# Patient Record
Sex: MALE | Race: WHITE | NOT HISPANIC OR LATINO | URBAN - METROPOLITAN AREA
[De-identification: names, ages, dates, MRNs, and addresses within clinical notes are randomized per-mention and may not be internally consistent; named-entity substitution may affect disease eponyms.]

---

## 2021-10-27 ENCOUNTER — INPATIENT (INPATIENT)
Facility: HOSPITAL | Age: 41
LOS: 3 days | Discharge: HOME | DRG: 103 | End: 2021-10-31
Attending: PSYCHIATRY & NEUROLOGY | Admitting: PSYCHIATRY & NEUROLOGY
Payer: COMMERCIAL

## 2021-10-27 VITALS
HEART RATE: 91 BPM | HEIGHT: 72 IN | SYSTOLIC BLOOD PRESSURE: 127 MMHG | DIASTOLIC BLOOD PRESSURE: 95 MMHG | OXYGEN SATURATION: 94 % | TEMPERATURE: 98 F | RESPIRATION RATE: 15 BRPM | WEIGHT: 184.97 LBS

## 2021-10-27 LAB
ALBUMIN SERPL ELPH-MCNC: 3.9 G/DL — SIGNIFICANT CHANGE UP (ref 3.4–5)
ALP SERPL-CCNC: 84 U/L — SIGNIFICANT CHANGE UP (ref 40–120)
ALT FLD-CCNC: 20 U/L — SIGNIFICANT CHANGE UP (ref 12–42)
ANION GAP SERPL CALC-SCNC: 11 MMOL/L — SIGNIFICANT CHANGE UP (ref 9–16)
APTT BLD: 37.8 SEC — HIGH (ref 27.5–35.5)
AST SERPL-CCNC: 15 U/L — SIGNIFICANT CHANGE UP (ref 15–37)
BASOPHILS # BLD AUTO: 0.08 K/UL — SIGNIFICANT CHANGE UP (ref 0–0.2)
BASOPHILS NFR BLD AUTO: 1 % — SIGNIFICANT CHANGE UP (ref 0–2)
BILIRUB SERPL-MCNC: 0.7 MG/DL — SIGNIFICANT CHANGE UP (ref 0.2–1.2)
BUN SERPL-MCNC: 12 MG/DL — SIGNIFICANT CHANGE UP (ref 7–23)
CALCIUM SERPL-MCNC: 9.2 MG/DL — SIGNIFICANT CHANGE UP (ref 8.5–10.5)
CHLORIDE SERPL-SCNC: 103 MMOL/L — SIGNIFICANT CHANGE UP (ref 96–108)
CO2 SERPL-SCNC: 26 MMOL/L — SIGNIFICANT CHANGE UP (ref 22–31)
CREAT SERPL-MCNC: 0.81 MG/DL — SIGNIFICANT CHANGE UP (ref 0.5–1.3)
EOSINOPHIL # BLD AUTO: 0.17 K/UL — SIGNIFICANT CHANGE UP (ref 0–0.5)
EOSINOPHIL NFR BLD AUTO: 2.1 % — SIGNIFICANT CHANGE UP (ref 0–6)
GLUCOSE BLDC GLUCOMTR-MCNC: 89 MG/DL — SIGNIFICANT CHANGE UP (ref 70–99)
GLUCOSE SERPL-MCNC: 99 MG/DL — SIGNIFICANT CHANGE UP (ref 70–99)
HCT VFR BLD CALC: 46.7 % — SIGNIFICANT CHANGE UP (ref 39–50)
HGB BLD-MCNC: 15.9 G/DL — SIGNIFICANT CHANGE UP (ref 13–17)
IMM GRANULOCYTES NFR BLD AUTO: 0.5 % — SIGNIFICANT CHANGE UP (ref 0–1.5)
INR BLD: 1.02 — SIGNIFICANT CHANGE UP (ref 0.88–1.16)
LYMPHOCYTES # BLD AUTO: 2.61 K/UL — SIGNIFICANT CHANGE UP (ref 1–3.3)
LYMPHOCYTES # BLD AUTO: 31.5 % — SIGNIFICANT CHANGE UP (ref 13–44)
MCHC RBC-ENTMCNC: 31.1 PG — SIGNIFICANT CHANGE UP (ref 27–34)
MCHC RBC-ENTMCNC: 34 GM/DL — SIGNIFICANT CHANGE UP (ref 32–36)
MCV RBC AUTO: 91.2 FL — SIGNIFICANT CHANGE UP (ref 80–100)
MONOCYTES # BLD AUTO: 0.82 K/UL — SIGNIFICANT CHANGE UP (ref 0–0.9)
MONOCYTES NFR BLD AUTO: 9.9 % — SIGNIFICANT CHANGE UP (ref 2–14)
NEUTROPHILS # BLD AUTO: 4.57 K/UL — SIGNIFICANT CHANGE UP (ref 1.8–7.4)
NEUTROPHILS NFR BLD AUTO: 55 % — SIGNIFICANT CHANGE UP (ref 43–77)
NRBC # BLD: 0 /100 WBCS — SIGNIFICANT CHANGE UP (ref 0–0)
PLATELET # BLD AUTO: 274 K/UL — SIGNIFICANT CHANGE UP (ref 150–400)
POTASSIUM SERPL-MCNC: 4.2 MMOL/L — SIGNIFICANT CHANGE UP (ref 3.5–5.3)
POTASSIUM SERPL-SCNC: 4.2 MMOL/L — SIGNIFICANT CHANGE UP (ref 3.5–5.3)
PROT SERPL-MCNC: 7.6 G/DL — SIGNIFICANT CHANGE UP (ref 6.4–8.2)
PROTHROM AB SERPL-ACNC: 12 SEC — SIGNIFICANT CHANGE UP (ref 10.6–13.6)
RBC # BLD: 5.12 M/UL — SIGNIFICANT CHANGE UP (ref 4.2–5.8)
RBC # FLD: 12.5 % — SIGNIFICANT CHANGE UP (ref 10.3–14.5)
SARS-COV-2 RNA SPEC QL NAA+PROBE: SIGNIFICANT CHANGE UP
SODIUM SERPL-SCNC: 140 MMOL/L — SIGNIFICANT CHANGE UP (ref 132–145)
TROPONIN I, HIGH SENSITIVITY RESULT: 4.6 NG/L — SIGNIFICANT CHANGE UP
WBC # BLD: 8.29 K/UL — SIGNIFICANT CHANGE UP (ref 3.8–10.5)
WBC # FLD AUTO: 8.29 K/UL — SIGNIFICANT CHANGE UP (ref 3.8–10.5)

## 2021-10-27 PROCEDURE — 70450 CT HEAD/BRAIN W/O DYE: CPT | Mod: 26,59

## 2021-10-27 PROCEDURE — 71275 CT ANGIOGRAPHY CHEST: CPT | Mod: 26

## 2021-10-27 PROCEDURE — 93010 ELECTROCARDIOGRAM REPORT: CPT

## 2021-10-27 PROCEDURE — 70498 CT ANGIOGRAPHY NECK: CPT | Mod: 26

## 2021-10-27 PROCEDURE — 0042T: CPT | Mod: 26

## 2021-10-27 PROCEDURE — 70496 CT ANGIOGRAPHY HEAD: CPT | Mod: 26

## 2021-10-27 PROCEDURE — 74174 CTA ABD&PLVS W/CONTRAST: CPT | Mod: 26

## 2021-10-27 PROCEDURE — 99292 CRITICAL CARE ADDL 30 MIN: CPT

## 2021-10-27 PROCEDURE — 71046 X-RAY EXAM CHEST 2 VIEWS: CPT | Mod: 26

## 2021-10-27 PROCEDURE — 99291 CRITICAL CARE FIRST HOUR: CPT | Mod: 25

## 2021-10-27 RX ORDER — ATORVASTATIN CALCIUM 80 MG/1
80 TABLET, FILM COATED ORAL AT BEDTIME
Refills: 0 | Status: DISCONTINUED | OUTPATIENT
Start: 2021-10-27 | End: 2021-10-31

## 2021-10-27 RX ORDER — ASPIRIN/CALCIUM CARB/MAGNESIUM 324 MG
325 TABLET ORAL ONCE
Refills: 0 | Status: COMPLETED | OUTPATIENT
Start: 2021-10-27 | End: 2021-10-27

## 2021-10-27 RX ORDER — ASPIRIN/CALCIUM CARB/MAGNESIUM 324 MG
81 TABLET ORAL DAILY
Refills: 0 | Status: DISCONTINUED | OUTPATIENT
Start: 2021-10-28 | End: 2021-10-31

## 2021-10-27 RX ORDER — CLOPIDOGREL BISULFATE 75 MG/1
75 TABLET, FILM COATED ORAL DAILY
Refills: 0 | Status: DISCONTINUED | OUTPATIENT
Start: 2021-10-28 | End: 2021-10-28

## 2021-10-27 RX ORDER — ENOXAPARIN SODIUM 100 MG/ML
40 INJECTION SUBCUTANEOUS AT BEDTIME
Refills: 0 | Status: DISCONTINUED | OUTPATIENT
Start: 2021-10-27 | End: 2021-10-30

## 2021-10-27 RX ADMIN — Medication 325 MILLIGRAM(S): at 15:06

## 2021-10-27 RX ADMIN — ENOXAPARIN SODIUM 40 MILLIGRAM(S): 100 INJECTION SUBCUTANEOUS at 23:17

## 2021-10-27 RX ADMIN — ATORVASTATIN CALCIUM 80 MILLIGRAM(S): 80 TABLET, FILM COATED ORAL at 23:17

## 2021-10-27 NOTE — ED PROVIDER NOTE - NEUROLOGICAL, MLM
Left sided facial droop. Left sided facial numbness. Decreased gross sensation to L arm and L leg. Preserved ability to raise brows. Normal finger to nose.

## 2021-10-27 NOTE — ED ADULT NURSE NOTE - OBJECTIVE STATEMENT
Pt with c/o intermittent chest pain and sob x3 days. This morning pt woke up experiencing numbness to Lt side of body. Stroke code was called at 11:45 by Dr Adrian.

## 2021-10-27 NOTE — ED PROVIDER NOTE - OBJECTIVE STATEMENT
42 y/o M with no PMHx [active smoker] presents to the ED for evaluation. Pt reports he was having some CP about 3 days ago that started "while doing nothing." He describes the pain as mostly centrally located. Pt noted the CP worsened 2 days ago and was described as waxing and waning [sharp at times] that is worse with swallowing food.  He also noted some pain to the L side of his body [face, arm, and leg]. Pt went to sleep at midnight last night. This morning, he woke up at 6:30am feeling as though he did not sleep well. Pt states he felt as though he was not moving "fast enough." Pt noted having new L sided numbness [face, arm, leg] and felt it was difficult to catch his breath. He also states his L eye feels foggy and tingly. Pt endorses the face numbness is constant while the L arm and leg numbness is intermittent. His wife had to help walk him to the ED as he was having significant dizziness. Pt states he has not seen a PCP since the COVID-19 pandemic started. He denies fevers, chills, rash, neck pain, or urinary symptoms.

## 2021-10-27 NOTE — ED ADULT NURSE REASSESSMENT NOTE - NS ED NURSE REASSESS COMMENT FT1
1410 Dysphasia test complete - see flowsheet   1622 Pt in no acute distress denies any discomfort at this time , jose miguel toribio  1751 Pt admitted to [facility] , pending transportation jose miguel toribio
Pt. received from RN Tyler Brennan3, semi fowlers in stretcher breathing at ease on room air in NAD. 20g to RAC no redness, swelling, or tenderness noted. Pt is admitted to [facility] 5[facility]. Transport at bedside. Wife at bedside.

## 2021-10-27 NOTE — ED PROVIDER NOTE - CRITICAL CARE ATTENDING CONTRIBUTION TO CARE
The patient was seen immediately upon arrival due to a high probability of imminent or life-threatening deterioration secondary to stroke activation, which required my direct attention, intervention, and personal management at the bedside. I have personally provided critical care time exclusive of time spent on separately billable procedures. Time includes review of laboratory data, radiology results, discussion with consultants, discussion with the patient's family, and monitoring for potential decompensation.

## 2021-10-27 NOTE — H&P ADULT - NSHPLABSRESULTS_GEN_ALL_CORE
Vital Signs Last 24 Hrs  T(C): 36.8 (27 Oct 2021 21:08), Max: 36.8 (27 Oct 2021 08:58)  T(F): 98.3 (27 Oct 2021 21:08), Max: 98.3 (27 Oct 2021 20:17)  HR: 71 (27 Oct 2021 21:28) (71 - 91)  BP: 134/96 (27 Oct 2021 21:28) (124/83 - 153/91)  BP(mean): 110 (27 Oct 2021 21:28) (98 - 110)  RR: 18 (27 Oct 2021 21:28) (15 - 18)  SpO2: 97% (27 Oct 2021 21:28) (94% - 98%)      Fingerstick Blood Glucose: CAPILLARY BLOOD GLUCOSE      POCT Blood Glucose.: 89 mg/dL (27 Oct 2021 11:47)    LABS:                        15.9   8.29  )-----------( 274      ( 27 Oct 2021 12:19 )             46.7     10-27    140  |  103  |  12  ----------------------------<  99  4.2   |  26  |  0.81    Ca    9.2      27 Oct 2021 12:19    TPro  7.6  /  Alb  3.9  /  TBili  0.7  /  DBili  x   /  AST  15  /  ALT  20  /  AlkPhos  84  10-27    PT/INR - ( 27 Oct 2021 12:19 )   PT: 12.0 sec;   INR: 1.02          PTT - ( 27 Oct 2021 12:19 )  PTT:37.8 sec          RADIOLOGY & ADDITIONAL STUDIES:    HCT:     CTA: Vital Signs Last 24 Hrs  T(C): 36.8 (27 Oct 2021 21:08), Max: 36.8 (27 Oct 2021 08:58)  T(F): 98.3 (27 Oct 2021 21:08), Max: 98.3 (27 Oct 2021 20:17)  HR: 71 (27 Oct 2021 21:28) (71 - 91)  BP: 134/96 (27 Oct 2021 21:28) (124/83 - 153/91)  BP(mean): 110 (27 Oct 2021 21:28) (98 - 110)  RR: 18 (27 Oct 2021 21:28) (15 - 18)  SpO2: 97% (27 Oct 2021 21:28) (94% - 98%)      Fingerstick Blood Glucose: CAPILLARY BLOOD GLUCOSE      POCT Blood Glucose.: 89 mg/dL (27 Oct 2021 11:47)    LABS:                        15.9   8.29  )-----------( 274      ( 27 Oct 2021 12:19 )             46.7     10-27    140  |  103  |  12  ----------------------------<  99  4.2   |  26  |  0.81    Ca    9.2      27 Oct 2021 12:19    TPro  7.6  /  Alb  3.9  /  TBili  0.7  /  DBili  x   /  AST  15  /  ALT  20  /  AlkPhos  84  10-27    PT/INR - ( 27 Oct 2021 12:19 )   PT: 12.0 sec;   INR: 1.02          PTT - ( 27 Oct 2021 12:19 )  PTT:37.8 sec      RADIOLOGY & ADDITIONAL STUDIES:    HCT:     CTA: Vital Signs Last 24 Hrs  T(C): 36.8 (27 Oct 2021 21:08), Max: 36.8 (27 Oct 2021 08:58)  T(F): 98.3 (27 Oct 2021 21:08), Max: 98.3 (27 Oct 2021 20:17)  HR: 71 (27 Oct 2021 21:28) (71 - 91)  BP: 134/96 (27 Oct 2021 21:28) (124/83 - 153/91)  BP(mean): 110 (27 Oct 2021 21:28) (98 - 110)  RR: 18 (27 Oct 2021 21:28) (15 - 18)  SpO2: 97% (27 Oct 2021 21:28) (94% - 98%)      Fingerstick Blood Glucose: CAPILLARY BLOOD GLUCOSE      POCT Blood Glucose.: 89 mg/dL (27 Oct 2021 11:47)    LABS:                        15.9   8.29  )-----------( 274      ( 27 Oct 2021 12:19 )             46.7     10-27    140  |  103  |  12  ----------------------------<  99  4.2   |  26  |  0.81    Ca    9.2      27 Oct 2021 12:19    TPro  7.6  /  Alb  3.9  /  TBili  0.7  /  DBili  x   /  AST  15  /  ALT  20  /  AlkPhos  84  10-27    PT/INR - ( 27 Oct 2021 12:19 )   PT: 12.0 sec;   INR: 1.02          PTT - ( 27 Oct 2021 12:19 )  PTT:37.8 sec      RADIOLOGY & ADDITIONAL STUDIES:    HCT: No acute intracranial hemorrhage, mass effect or large demarcated territorial infarction..    CTA: < from: CT Angio Head w/ IV Cont (10.27.21 @ 12:26) >  IMPRESSION: No large vessel occlusion.    < from: CT Angio Neck w/ IV Cont (10.27.21 @ 12:26) >  IMPRESSION: Normal CTA of the neck.    < from: CT Perfusion w/ Maps w/ IV Cont (10.27.21 @ 12:23) >  IMPRESSION: Normal CT perfusion study.    < from: CT Angio Chest PE Protocol w/ IV Cont (10.27.21 @ 12:32) >    IMPRESSION:  1.  Spiculated cavitary pulmonary nodule in right upper lobe 0.9 cm, suspicious for primary lung malignancy.  2.  Few upper limit of normal size mediastinal nodes, possibly malignant adenopathy. Advise FDG PET/CT correlation and/or tissue sampling.  3.  Mild centrilobular and paraseptal emphysema.  4.  No aortic dissection.

## 2021-10-27 NOTE — ED PROVIDER NOTE - CLINICAL SUMMARY MEDICAL DECISION MAKING FREE TEXT BOX
40 y/o M presenting with L sided facial numbness as well as L arm and L leg numbness. Pt also noted to have facial droop. Pt woke up at 6:30 am today with symptoms. Stroke code activated. Discussed case with stroke neurologist Dr. Isaac who does not think there is a large vessel stroke. Dr Isaac states Pt is not a TPA candidate but agrees with concern for possible dissection. CT neck and CTA ordered to r/o dissection. If negative, will plan for likely transfer to [facility] for admission to neurology. 40 y/o M presenting with L sided facial numbness as well as L arm and L leg numbness. Pt also noted to have facial droop. Pt woke up at 6:30 am today with symptoms. Stroke code activated. Discussed case with stroke neurologist Dr. Isaac who does not think there is a large vessel stroke. Dr Isaac states Pt is not a TPA candidate but agrees with concern for possible dissection. CT neck and CTA ordered to r/o dissection. If negative, will plan for likely transfer to [facility] for admission to neurology.    430pm: patient found to have R sided 9mm spiculated mass on CT, concerning per radiology for cancer. Discussed findings with patient and Dr. Isaac. To be admitted to [facility] for further evaluation.

## 2021-10-27 NOTE — ED PROVIDER NOTE - PROGRESS NOTE DETAILS
Went to go see Pt but he was at XR at the time. Discussed case with Dr. Isaac Preliminary CT read is negative as per radiology resident. Stroke code activated Pt taken to CT scan Discussed case with Dr. Isaac of neurology who states stroke cannot be ruled out but not Pt is not a TPA candidate as he is out of the window. Discussed case with Dr. Isaac of neurology who states Pt is not a TPA candidate as he is out of the window. Spoke with Dr. Isaac who would like Pt admitted and given a dose of Tylenol.

## 2021-10-27 NOTE — H&P ADULT - NSHPPHYSICALEXAM_GEN_ALL_CORE
Physical exam:  General: No acute distress, awake and alert  Cardiovascular: Regular rate and rhythm, no murmurs, rubs, or gallops. No bruits  Pulmonary: Anterior breath sounds clear bilaterally, no crackles or wheezing. No use of accessory muscles  GI: Abdomen soft, non-tender, non-distended    Neurologic:  -Mental status: Awake, alert, oriented to person, place, and time. Speech is fluent with intact naming, repetition, and comprehension, no dysarthria. Recent and remote memory intact. Follows commands. Attention/concentration intact. Fund of knowledge appropriate.  -Cranial nerves:   II: Visual fields are full to confrontation.  III, IV, VI: Extraocular movements are intact without nystagmus. Pupils equally round and reactive to light  V:  Facial sensation V1-V3 equal and intact   VII: Face is symmetric with normal eye closure and smile  VIII: Hearing is bilaterally intact to finger rub  IX, X: Uvula is midline and soft palate rises symmetrically  XI: Head turning and shoulder shrug are intact.  XII: Tongue protrudes midline  Motor: Normal bulk and tone. No pronator drift. Strength bilateral upper extremity 5/5, bilateral lower extremities 5/5.  Rapid alternating movements intact and symmetric  Sensation: Intact to light touch bilaterally. No neglect or extinction on double simultaneous testing.  Coordination: No dysmetria on finger-to-nose and heel-to-shin bilaterally  Reflexes: Downgoing toes bilaterally   Gait: Narrow gait and steady    NIHSS: **** ASPECT Score: *** ICH Score: *** (GCS) Physical exam:  General: No acute distress, awake and alert  Cardiovascular: Regular rate and rhythm, no murmurs, rubs, or gallops. No bruits  Pulmonary: Anterior breath sounds clear bilaterally, no crackles or wheezing. No use of accessory muscles  GI: Abdomen soft, non-tender, non-distended    Neurologic:  -Mental status: Awake, alert, oriented to person, place, and time. Speech is fluent with intact naming, repetition, and comprehension, no dysarthria. Recent and remote memory intact. Follows commands. Attention/concentration intact. Fund of knowledge appropriate.  -Cranial nerves:   II: Visual fields are full to confrontation, left eye blurry vision > right eye  III, IV, VI: Extraocular movements are intact without nystagmus. Pupils equally round and reactive to light  V:  left V1-V3 ~70% sensation compared to right V1-V3  VII: mild left facial droop.  VIII: Hearing is grossly intact bilaterally  IX, X: Uvula is midline and soft palate rises symmetrically  XI: Head turning and shoulder shrug are intact.  XII: Tongue protrudes midline  Motor: Normal bulk and tone. No pronator drift. Strength bilateral upper extremity 5/5, bilateral lower extremities 5/5.  Sensation: Left arm and leg ~80% sensation to light touch compared to right side. No neglect or extinction on double simultaneous testing.  Coordination: No dysmetria on finger-to-nose and heel-to-shin bilaterally  Reflexes: Downgoing toes bilaterally   Gait: Narrow gait and steady    NIHSS: 2 ASPECT Score: 10 Physical exam:  General: No acute distress, awake and alert  Cardiovascular: Regular rate and rhythm, no murmurs, rubs, or gallops.   Pulmonary: Anterior breath sounds clear bilaterally, no crackles or wheezing. No use of accessory muscles  GI: Abdomen soft, non-tender, non-distended    Neurologic:  -Mental status: Awake, alert, oriented to person, place, and time. Speech is fluent with intact naming, repetition, and comprehension, no dysarthria. Recent and remote memory intact. Follows commands. Attention/concentration intact. Fund of knowledge appropriate.  -Cranial nerves:   II: Visual fields are full to confrontation, left eye blurry vision > right eye  III, IV, VI: Extraocular movements are intact without nystagmus. Pupils equally round and reactive to light  V:  left V1-V3 ~70% sensation compared to right V1-V3  VII: mild left facial droop.  VIII: Hearing is grossly intact bilaterally  IX, X: Uvula is midline and soft palate rises symmetrically  XI: Head turning and shoulder shrug are intact.  XII: Tongue protrudes midline  Motor: Normal bulk and tone. No pronator drift. Strength bilateral upper extremity 5/5, bilateral lower extremities 5/5.  Sensation: Left arm and leg ~80% sensation to light touch compared to right side. No neglect or extinction on double simultaneous testing.  Coordination: No dysmetria on finger-to-nose and heel-to-shin bilaterally  Reflexes: Downgoing toes bilaterally   Gait: Narrow gait and steady    NIHSS: 2 ASPECT Score: 10

## 2021-10-27 NOTE — H&P ADULT - NSHPSOCIALHISTORY_GEN_ALL_CORE
SOCIAL HISTORY:   Patient lives with *** at ***.   Smoking status:  Drinking:  Drug Use: SOCIAL HISTORY:   Patient lives with wife in apartment  Smoking status: 39 pack years  Drinking: 3-5 drinks a day during weekend  Drug Use: denies

## 2021-10-27 NOTE — PATIENT PROFILE ADULT - COMPLETE THE FOLLOWING IF THE PATIENT REFUSES THE INFLUENZA VACCINE:
Patient seen and examined at bedside with me.  No complaints.  Pain well controlled.  Tolerating regular diet.  Denies nausea or vomiting.  Ambulating.  Voiding without difficulty.  Passing flatus.  Bottle feeding.  Denies HA, dizziness, CP, SOB, LE pain.  VSS.  incision c/d/i.  Plan to DC home today.  DC instructions reviewed.  Call parameters reviewed.
Risks/benefits discussed with patient/surrogate

## 2021-10-27 NOTE — H&P ADULT - ASSESSMENT
41y Male with 39 pack year hx, no PMHx, last seen PCP 3 years ago, presents to [facility] for chest pain/SOB x 3 days and acute onset of left sided numbness starting at 0630 10/27, transferred to [facility] for stroke workup and r/o. NIHSS on arrival is 2 (left facial, left sensation deficit), ASPECT 10. Found to have lung mass suspicious for malignancy on Chest CT PE protocol. Admitted to stroke tele.     Neuro  #CVA workup  - continue aspirin 81mg daily  - continue atorvastatin 80mg daily  - q4hr stroke neuro checks and vitals  - obtain MRI Brain without contrast short stroke protocol  - Stroke Code HCT Results: neg  - Stroke Code CTA Results: neg  - Stroke education    Cards  #HTN  - permissive hypertension, Goal -180  - obtain TTE with bubble  - Stroke Code EKG Results: NSR    #HLD  - high dose statin as above in CVA  - LDL results: pending    Pulm  - call provider if SPO2 < 94%  - spiculated cavitary pulmonary nodule in right upper lobe 0.9cm, possible malignant adenopathy, pending further recs    GI  #Nutrition/Fluids/Electrolytes   - replete K<4 and Mg <2  - Diet: regular    Renal  - monitor I&Os    Infectious Disease  - monitor and trend WBCs    Endocrine  #DM  - A1C results: pending    - TSH results: pending    DVT Prophylaxis  - lovenox sq for DVT prophylaxis   - SCDs for DVT prophylaxis     Dispo: pending PT/OT eval     Discussed with Neurology Attending Dr. Isaac

## 2021-10-27 NOTE — H&P ADULT - NSHPREVIEWOFSYSTEMS_GEN_ALL_CORE
ROS: ***  Constitutional: No fever, weight loss or fatigue  Eyes: No eye pain, visual disturbances, or discharge  ENMT:  No difficulty hearing, tinnitus, vertigo; No sinus or throat pain  Neck: No pain or stiffness  Respiratory: No cough, wheezing, chills or hemoptysis  Cardiovascular: No chest pain, palpitations, shortness of breath, dizziness or leg swelling  Gastrointestinal: No abdominal pain. No nausea, vomiting or hematemesis; No diarrhea or constipation. Nohematochezia.  Genitourinary: No dysuria, frequency, hematuria or incontinence  Neurological: As per HPI  Skin: No itching, burning, rashes or lesions   Endocrine: No heat or cold intolerance; No hair loss  Musculoskeletal: No joint pain or swelling; No muscle, back or extremity pain  Psychiatric: No depression, anxiety, mood swings or difficulty sleeping  Heme/Lymph: No easy bruising or bleeding gums ROS:   Constitutional: No fever, weight loss or fatigue  Eyes: No eye pain or discharge  ENMT:  No difficulty hearing, tinnitus, vertigo; No sinus or throat pain  Neck: No pain or stiffness  Respiratory: No cough, wheezing, chills or hemoptysis  Cardiovascular: No palpitations, leg swelling  Gastrointestinal: No abdominal pain. No nausea, vomiting or hematemesis; No diarrhea or constipation.  Genitourinary: No dysuria, frequency, hematuria or incontinence  Neurological: As per HPI

## 2021-10-27 NOTE — H&P ADULT - HISTORY OF PRESENT ILLNESS
**STROKE HPI***    HPI: 41y Male with 39 pack year hx, no PMHx, last seen PCP 3 years ago, presents to [facility] for chest pain/SOB x 3 days and acute onset of left sided numbness starting at 0630 this AM. Pt states he was having chest pain on Sunday along with some SOB "while doing nothing" and stated his pain was centrally located. Pt then     · HPI Objective Statement: 42 y/o M with no PMHx [active smoker] presents to the ED for evaluation. Pt reports he was having some CP about 3 days ago that started "while doing nothing." He describes the pain as mostly centrally located. Pt noted the CP worsened 2 days ago and was described as waxing and waning [sharp at times] that is worse with swallowing food.  He also noted some pain to the L side of his body [face, arm, and leg]. Pt went to sleep at midnight last night. This morning, he woke up at 6:30am feeling as though he did not sleep well. Pt states he felt as though he was not moving "fast enough." Pt noted having new L sided numbness [face, arm, leg] and felt it was difficult to catch his breath. He also states his L eye feels foggy and tingly. Pt endorses the face numbness is constant while the L arm and leg numbness is intermittent. His wife had to help walk him to the ED as he was having significant dizziness. Pt states he has not seen a PCP since the COVID-19 pandemic started. He denies fevers, chills, rash, neck pain, or urinary symptoms.    **STROKE HPI***  incompelete  HPI: 41y Male with 39 pack year hx, no PMHx, last seen PCP 3 years ago, presents to [facility] for chest pain/SOB x 3 days and acute onset of left sided numbness starting at 0630 this AM. Pt states he was having chest pain on Sunday along with some SOB "while doing nothing" and stated his pain was centrally located. Pt noted chest pain worsened on monday and was provoked with swallowing food, also had pain of his left face, arm, and leg, stated he went to bed at midnight due to the pain. Pt then woke up on Wednesday morning at 0630 and shortly after started to have new left face, arm, and leg numbness and episode of SOB. Pt states numbness is intermittent, usually lasts around 15 seconds, and resolves itself however there is a short interval between each episode, states it feels like a "warm, tingly sensation" states happened at least 100 times today. Pt also endorses blurry vision from his left eye. Notes that he felt lightheaded and dizzy, had difficulty walking, denied room spinning sensation. States he felt "generally weak" and  felt "off." Pt denied any slurred speech, facial asymmetry, headache, focal weakness of extremities, abd pain, n/v/d.     Stroke code called at [facility], NIHSS 1, CT imaging negative for acute pathology. Noted to have left facial droop. After speaking with Dr. Isaac, pt was planned to be admitted to [facility] under stroke service to r/o stroke and further workup.    **STROKE HPI***  HPI: 41y Male with 39 pack year hx, no PMHx, last seen PCP 3 years ago, presents to [facility] for chest pain/SOB x 3 days and acute onset of left sided numbness starting at 0630 this AM. Pt states he was having chest pain on Sunday along with some SOB "while doing nothing" and stated his pain was centrally located. Pt noted chest pain worsened on monday and was provoked with swallowing food, also had pain of his left face, arm, and leg, stated he went to bed at midnight due to the pain. Pt then woke up on Wednesday morning at 0630 and shortly after started to have new left face, arm, and leg numbness and episode of SOB. Pt states numbness is intermittent, usually lasts around 15 seconds, and resolves itself however there is a short interval between each episode, states it feels like a "warm, tingly sensation" states happened at least 100 times today. Pt also endorses blurry vision from his left eye. Notes that he felt lightheaded and dizzy, had difficulty walking, denied room spinning sensation. States he felt "generally weak" and  felt "off." Pt denied any slurred speech, facial asymmetry, headache, focal weakness of extremities, abd pain, n/v/d.     Stroke code called at [facility], NIHSS 1, CT imaging negative for acute pathology. Noted to have left facial droop. Troponin negatives at [facility] ED. After speaking with Dr. Isaac, pt was planned to be admitted to [facility] under stroke service to r/o stroke and further workup.    ED Vitals:   T: 98.2F, HR: 91bpm, BP: 127/95mmHg, RR: 15/min, SpO2: 94% RA    Of note, pt is fully vaccinated for COVID-19 with J&J vaccine in April 2021, is fully independent, FULL CODE.

## 2021-10-28 DIAGNOSIS — R20.0 ANESTHESIA OF SKIN: ICD-10-CM

## 2021-10-28 DIAGNOSIS — F17.200 NICOTINE DEPENDENCE, UNSPECIFIED, UNCOMPLICATED: ICD-10-CM

## 2021-10-28 DIAGNOSIS — R07.89 OTHER CHEST PAIN: ICD-10-CM

## 2021-10-28 DIAGNOSIS — R91.1 SOLITARY PULMONARY NODULE: ICD-10-CM

## 2021-10-28 LAB
A1C WITH ESTIMATED AVERAGE GLUCOSE RESULT: 5.4 % — SIGNIFICANT CHANGE UP (ref 4–5.6)
ANION GAP SERPL CALC-SCNC: 6 MMOL/L — SIGNIFICANT CHANGE UP (ref 5–17)
BUN SERPL-MCNC: 12 MG/DL — SIGNIFICANT CHANGE UP (ref 7–23)
CALCIUM SERPL-MCNC: 9.4 MG/DL — SIGNIFICANT CHANGE UP (ref 8.4–10.5)
CHLORIDE SERPL-SCNC: 104 MMOL/L — SIGNIFICANT CHANGE UP (ref 96–108)
CHOLEST SERPL-MCNC: 245 MG/DL — HIGH
CO2 SERPL-SCNC: 28 MMOL/L — SIGNIFICANT CHANGE UP (ref 22–31)
CREAT SERPL-MCNC: 0.89 MG/DL — SIGNIFICANT CHANGE UP (ref 0.5–1.3)
ESTIMATED AVERAGE GLUCOSE: 108 MG/DL — SIGNIFICANT CHANGE UP (ref 68–114)
GLUCOSE SERPL-MCNC: 103 MG/DL — HIGH (ref 70–99)
HCT VFR BLD CALC: 47.5 % — SIGNIFICANT CHANGE UP (ref 39–50)
HDLC SERPL-MCNC: 68 MG/DL — SIGNIFICANT CHANGE UP
HGB BLD-MCNC: 15.5 G/DL — SIGNIFICANT CHANGE UP (ref 13–17)
LIPID PNL WITH DIRECT LDL SERPL: 152 MG/DL — HIGH
MAGNESIUM SERPL-MCNC: 2.2 MG/DL — SIGNIFICANT CHANGE UP (ref 1.6–2.6)
MCHC RBC-ENTMCNC: 30.6 PG — SIGNIFICANT CHANGE UP (ref 27–34)
MCHC RBC-ENTMCNC: 32.6 GM/DL — SIGNIFICANT CHANGE UP (ref 32–36)
MCV RBC AUTO: 93.7 FL — SIGNIFICANT CHANGE UP (ref 80–100)
NON HDL CHOLESTEROL: 177 MG/DL — HIGH
NRBC # BLD: 0 /100 WBCS — SIGNIFICANT CHANGE UP (ref 0–0)
PHOSPHATE SERPL-MCNC: 3.4 MG/DL — SIGNIFICANT CHANGE UP (ref 2.5–4.5)
PLATELET # BLD AUTO: 265 K/UL — SIGNIFICANT CHANGE UP (ref 150–400)
POTASSIUM SERPL-MCNC: 4.5 MMOL/L — SIGNIFICANT CHANGE UP (ref 3.5–5.3)
POTASSIUM SERPL-SCNC: 4.5 MMOL/L — SIGNIFICANT CHANGE UP (ref 3.5–5.3)
RBC # BLD: 5.07 M/UL — SIGNIFICANT CHANGE UP (ref 4.2–5.8)
RBC # FLD: 12.7 % — SIGNIFICANT CHANGE UP (ref 10.3–14.5)
SODIUM SERPL-SCNC: 138 MMOL/L — SIGNIFICANT CHANGE UP (ref 135–145)
TRIGL SERPL-MCNC: 124 MG/DL — SIGNIFICANT CHANGE UP
TROPONIN T SERPL-MCNC: 0.01 NG/ML — SIGNIFICANT CHANGE UP (ref 0–0.01)
TSH SERPL-MCNC: 1.88 UIU/ML — SIGNIFICANT CHANGE UP (ref 0.27–4.2)
WBC # BLD: 7.41 K/UL — SIGNIFICANT CHANGE UP (ref 3.8–10.5)
WBC # FLD AUTO: 7.41 K/UL — SIGNIFICANT CHANGE UP (ref 3.8–10.5)

## 2021-10-28 PROCEDURE — 99223 1ST HOSP IP/OBS HIGH 75: CPT

## 2021-10-28 PROCEDURE — 93306 TTE W/DOPPLER COMPLETE: CPT | Mod: 26

## 2021-10-28 PROCEDURE — 99221 1ST HOSP IP/OBS SF/LOW 40: CPT

## 2021-10-28 RX ORDER — ACETAMINOPHEN 500 MG
650 TABLET ORAL EVERY 6 HOURS
Refills: 0 | Status: DISCONTINUED | OUTPATIENT
Start: 2021-10-28 | End: 2021-10-31

## 2021-10-28 RX ORDER — NICOTINE POLACRILEX 2 MG
2 GUM BUCCAL DAILY
Refills: 0 | Status: DISCONTINUED | OUTPATIENT
Start: 2021-10-28 | End: 2021-10-30

## 2021-10-28 RX ADMIN — ENOXAPARIN SODIUM 40 MILLIGRAM(S): 100 INJECTION SUBCUTANEOUS at 21:34

## 2021-10-28 RX ADMIN — Medication 650 MILLIGRAM(S): at 13:54

## 2021-10-28 RX ADMIN — Medication 650 MILLIGRAM(S): at 14:50

## 2021-10-28 RX ADMIN — ATORVASTATIN CALCIUM 80 MILLIGRAM(S): 80 TABLET, FILM COATED ORAL at 21:34

## 2021-10-28 RX ADMIN — Medication 2 MILLIGRAM(S): at 12:54

## 2021-10-28 RX ADMIN — Medication 81 MILLIGRAM(S): at 12:29

## 2021-10-28 NOTE — PROGRESS NOTE ADULT - ASSESSMENT
41y Male with 39 pack year hx, no PMHx, last seen PCP 3 years ago, presents to [facility] for chest pain/SOB x 3 days and acute onset of left sided numbness starting at 0630 10/27, transferred to [facility] for stroke workup and r/o. NIHSS on arrival is 2 (left facial, left sensation deficit), ASPECT 10. Found to have lung mass suspicious for malignancy on Chest CT PE protocol. Admitted to stroke tele. No acute overnight event. Pending MRI, TTE    Neuro  #CVA workup  - continue aspirin 81mg daily  - continue atorvastatin 80mg daily  - q4hr stroke neuro checks and vitals  - obtain MRI Brain w contrast to r/o mets along with stroke.  - Stroke Code HCT Results: neg  - Stroke Code CTA Results: neg  - Stroke education    Cards  - BP goal<140 mmHg.  - obtain TTE with bubble  - Stroke Code EKG Results: NSR    #HLD  - high dose statin as above in CVA  - LDL results: 152    Pulm  #Lung mass (incidental finding):   - call provider if SPO2 < 94%  - spiculated cavitary pulmonary nodule in right upper lobe 0.9cm, possible malignant adenopathy.  - Spoke to patient about the mass. Pending further consult to set up Outpatient follow up.    GI  #Nutrition/Fluids/Electrolytes   - replete K<4 and Mg <2  - Diet: regular    Renal  - monitor I&Os    Infectious Disease  - monitor and trend WBCs    Endocrine  #DM  - A1C results: 5.4    - TSH results: pending    DVT Prophylaxis  - lovenox sq for DVT prophylaxis     Dispo: pending PT/OT eval     Discussed with Neurology Attending Dr. Isaac    IDR: Discussed plan, GOC with CM,SW, PT, OT.

## 2021-10-28 NOTE — PROGRESS NOTE ADULT - SUBJECTIVE AND OBJECTIVE BOX
Neurology Stroke Progress Note    INTERVAL HPI/OVERNIGHT EVENTS:  Patient seen and examined. O/N no acute event. Didn't have anymore chest pain.    MEDICATIONS  (STANDING):  aspirin enteric coated 81 milliGRAM(s) Oral daily  atorvastatin 80 milliGRAM(s) Oral at bedtime  enoxaparin Injectable 40 milliGRAM(s) SubCutaneous at bedtime    MEDICATIONS  (PRN):      Allergies    No Known Allergies    Intolerances        Vital Signs Last 24 Hrs  T(C): 36.4 (28 Oct 2021 06:30), Max: 36.8 (27 Oct 2021 08:58)  T(F): 97.6 (28 Oct 2021 06:30), Max: 98.3 (27 Oct 2021 20:17)  HR: 67 (28 Oct 2021 06:49) (67 - 91)  BP: 124/85 (28 Oct 2021 06:49) (120/74 - 153/91)  BP(mean): 101 (28 Oct 2021 06:49) (91 - 110)  RR: 16 (28 Oct 2021 06:49) (15 - 18)  SpO2: 99% (28 Oct 2021 06:49) (94% - 99%)    Physical exam:  General: No acute distress, awake and alert  Eyes: Anicteric sclerae, moist conjunctivae, see below for CNs  Neck: trachea midline, FROM, supple, no thyromegaly or lymphadenopathy  Cardiovascular: Regular rate and rhythm, no murmurs, rubs, or gallops. No carotid bruits.   Pulmonary: Anterior breath sounds clear bilaterally, no crackles or wheezing. No use of accessory muscles  GI: Abdomen soft, non-distended, non-tender  Extremities: Radial and DP pulses +2, no edema    Neurologic:  -Mental status: Awake, alert, oriented to person, place, and time. Speech is fluent with intact naming, repetition, and comprehension, no dysarthria. Recent and remote memory intact. Follows commands. Attention/concentration intact. Fund of knowledge appropriate.  -Cranial nerves:   II: Visual fields are full to confrontation.  III, IV, VI: Extraocular movements are intact without nystagmus. Pupils equally round and reactive to light  V:  Facial sensation V1-V3 decreased on left side  VII: Face is symmetric with normal eye closure and smile  VIII: Hearing is bilaterally intact to finger rub  IX, X: Uvula is midline and soft palate rises symmetrically  XI: Head turning and shoulder shrug are intact.  XII: Tongue protrudes midline  Motor: Normal bulk and tone. No pronator drift. Strength bilateral upper extremity 5/5, bilateral lower extremities 5/5.  Rapid alternating movements intact and symmetric  Sensation: Decreased to light touch on left side. No neglect or extinction on double simultaneous testing.  Coordination: No dysmetria on finger-to-nose and heel-to-shin bilaterally  Reflexes: Downgoing toes bilaterally       LABS:                        15.5   7.41  )-----------( 265      ( 28 Oct 2021 07:25 )             47.5     10-28    138  |  104  |  12  ----------------------------<  103<H>  4.5   |  28  |  0.89    Ca    9.4      28 Oct 2021 07:25  Phos  3.4     10-28  Mg     2.2     10-28  TPro  7.6  /  Alb  3.9  /  TBili  0.7  /  DBili  x   /  AST  15  /  ALT  20  /  AlkPhos  84  10-27  PT/INR - ( 27 Oct 2021 12:19 )   PT: 12.0 sec;   INR: 1.02     PTT - ( 27 Oct 2021 12:19 )  PTT:37.8 sec    RADIOLOGY & ADDITIONAL TESTS:  < from: CT Brain Stroke Protocol (10.27.21 @ 11:53) >  No acute intracranial hemorrhage, mass effect or large demarcated territorial infarction..    < from: CT Angio Head w/ IV Cont (10.27.21 @ 12:26) >   Normal CTA of the neck and head.      < from: CT Perfusion w/ Maps w/ IV Cont (10.27.21 @ 12:23) >  FINDINGS: The CT perfusion study demonstrates no perfusion abnormality. There is no mismatch volume.    < from: CT Angio Chest PE Protocol w/ IV Cont (10.27.21 @ 12:32) >  Spiculated cavitary pulmonary nodule in right upper lobe 0.9 cm, suspicious for primary lung malignancy.  2.  Few upper limit of normal size mediastinal nodes, possibly malignant adenopathy. Advise FDG PET/CT correlation and/or tissue sampling.  3.  Mild centrilobular and paraseptal emphysema.  4.  No aortic dissection.             Neurology Stroke Progress Note    INTERVAL HPI/OVERNIGHT EVENTS:  Patient seen and examined. O/N no acute event. Didn't have anymore chest pain.    MEDICATIONS  (STANDING):  aspirin enteric coated 81 milliGRAM(s) Oral daily  atorvastatin 80 milliGRAM(s) Oral at bedtime  enoxaparin Injectable 40 milliGRAM(s) SubCutaneous at bedtime    MEDICATIONS  (PRN):      Allergies    No Known Allergies    Intolerances        Vital Signs Last 24 Hrs  T(C): 36.4 (28 Oct 2021 06:30), Max: 36.8 (27 Oct 2021 08:58)  T(F): 97.6 (28 Oct 2021 06:30), Max: 98.3 (27 Oct 2021 20:17)  HR: 67 (28 Oct 2021 06:49) (67 - 91)  BP: 124/85 (28 Oct 2021 06:49) (120/74 - 153/91)  BP(mean): 101 (28 Oct 2021 06:49) (91 - 110)  RR: 16 (28 Oct 2021 06:49) (15 - 18)  SpO2: 99% (28 Oct 2021 06:49) (94% - 99%)    Physical exam:  General: No acute distress, awake and alert  Eyes: Anicteric sclerae, moist conjunctivae, see below for CNs  Neck: trachea midline, FROM, supple, no thyromegaly or lymphadenopathy  Cardiovascular: Regular rate and rhythm, no murmurs, rubs, or gallops. No carotid bruits.   Pulmonary: Anterior breath sounds clear bilaterally, no crackles or wheezing. No use of accessory muscles  GI: Abdomen soft, non-distended, non-tender  Extremities: Radial and DP pulses +2, no edema    Neurologic:  -Mental status: Awake, alert, oriented to person, place, and time. Speech is fluent with intact naming, repetition, and comprehension, no dysarthria. Recent and remote memory intact. Follows commands. Attention/concentration intact. Fund of knowledge appropriate.  -Cranial nerves:   II: Visual fields are full to confrontation.  III, IV, VI: Extraocular movements are intact without nystagmus. Pupils equally round and reactive to light  V:  Facial sensation V1-V3 decreased on left side  VII: Subtle left facial droop noted.  VIII: Hearing is bilaterally intact to finger rub  IX, X: Uvula is midline and soft palate rises symmetrically  XI: Head turning and shoulder shrug are intact.  XII: Tongue protrudes midline  Motor: Normal bulk and tone. No pronator drift. Strength bilateral upper extremity 5/5, bilateral lower extremities 5/5.  Rapid alternating movements intact and symmetric  Sensation: Decreased to light touch on left side. No neglect or extinction on double simultaneous testing.  Coordination: No dysmetria on finger-to-nose and heel-to-shin bilaterally  Reflexes: Downgoing toes bilaterally       LABS:                        15.5   7.41  )-----------( 265      ( 28 Oct 2021 07:25 )             47.5     10-28    138  |  104  |  12  ----------------------------<  103<H>  4.5   |  28  |  0.89    Ca    9.4      28 Oct 2021 07:25  Phos  3.4     10-28  Mg     2.2     10-28  TPro  7.6  /  Alb  3.9  /  TBili  0.7  /  DBili  x   /  AST  15  /  ALT  20  /  AlkPhos  84  10-27  PT/INR - ( 27 Oct 2021 12:19 )   PT: 12.0 sec;   INR: 1.02     PTT - ( 27 Oct 2021 12:19 )  PTT:37.8 sec    RADIOLOGY & ADDITIONAL TESTS:  < from: CT Brain Stroke Protocol (10.27.21 @ 11:53) >  No acute intracranial hemorrhage, mass effect or large demarcated territorial infarction..    < from: CT Angio Head w/ IV Cont (10.27.21 @ 12:26) >   Normal CTA of the neck and head.      < from: CT Perfusion w/ Maps w/ IV Cont (10.27.21 @ 12:23) >  FINDINGS: The CT perfusion study demonstrates no perfusion abnormality. There is no mismatch volume.    < from: CT Angio Chest PE Protocol w/ IV Cont (10.27.21 @ 12:32) >  Spiculated cavitary pulmonary nodule in right upper lobe 0.9 cm, suspicious for primary lung malignancy.  2.  Few upper limit of normal size mediastinal nodes, possibly malignant adenopathy. Advise FDG PET/CT correlation and/or tissue sampling.  3.  Mild centrilobular and paraseptal emphysema.  4.  No aortic dissection.

## 2021-10-28 NOTE — CONSULT NOTE ADULT - SUBJECTIVE AND OBJECTIVE BOX
CC: L-sided numbness    HPI: 40 y/o M c/o acute-onset L-sided numbness at ~6:30am 10/27; Pt. also c/o ALANIS and pleuritic CP for the past few days; reports occasional/mild chronic cough; Pt. has otherwise been in his usual state of health, he denies F/C, SOB, hemoptysis, weight loss, sick contacts; today, Pt.’s sx have improved, but are not completely resolved   12-pt ROS otherwise negative    PMH: none    PSH: none  SH: 39 pack-year smoker; drinks EtOH on weekends; denies recreational drug use  FH: no h/o CVA in first-degree relatives  Allergies: NKDA    VS: 97.9 125/88 72 18 99% on RA    Exam:   Gen: comfortable-appearing in NAD   HEENT: MMM   CV: RRR, no JVD   Lungs: CTA B/L, normal WOB on RA   Abdomen: soft NT ND   Extremities: no edema B/L LE   Skin: WWP   Neuro: A&Ox3, no dysarthria, +decreased sensation to light touch on L side    Labs reviewed; HbA1c 5.4%, , TSH 1.880, troponin 0.01    Images reviewed; CT brain stroke protocol shows "no acute intracranial hemorrhage, mass effect or large demarcated territorial infarction;" CTA head and neck are unremarkable; TTE is unremarkable; CTA C/A/P shows "(1) spiculated cavitary pulmonary nodule in right upper lobe 0.9 cm, suspicious for primary lung malignancy; (2) few upper limit of normal size mediastinal nodes, possibly malignant adenopathy; (3) mild centrilobular and paraseptal emphysema, and (4) no aortic dissection"    EKG reviewed; NSR, no ischemic changes CC: L-sided numbness    HPI: 40 y/o M c/o acute-onset L-sided numbness at ~6:30am 10/27; Pt. also c/o ALANIS and pleuritic CP for the past few days; reports occasional/mild chronic cough; Pt. has otherwise been in his usual state of health, he denies F/C, SOB, hemoptysis, weight loss, sick contacts; today, Pt.’s sx have improved, but are not completely resolved   12-pt ROS otherwise negative    PMH: none    PSH: none  SH: 39 pack-year smoker; drinks EtOH on weekends; denies recreational drug use  FH: no h/o CVA, MI, or cancer in first-degree relatives; grandfather had a heart attack  Allergies: NKDA    VS: 97.9 125/88 72 18 99% on RA    Exam:   Gen: comfortable-appearing in NAD   HEENT: MMM   CV: RRR, no JVD   Lungs: CTA B/L, normal WOB on RA   Abdomen: soft NT ND   Extremities: no edema B/L LE   Skin: WWP   Neuro: A&Ox3, no dysarthria, +decreased sensation to light touch on L side    Labs reviewed; HbA1c 5.4%, , TSH 1.880, troponin 0.01    Images reviewed; CT brain stroke protocol shows "no acute intracranial hemorrhage, mass effect or large demarcated territorial infarction;" CTA head and neck are unremarkable; TTE is unremarkable; CTA C/A/P shows "(1) spiculated cavitary pulmonary nodule in right upper lobe 0.9 cm, suspicious for primary lung malignancy; (2) few upper limit of normal size mediastinal nodes, possibly malignant adenopathy; (3) mild centrilobular and paraseptal emphysema, and (4) no aortic dissection"    EKG reviewed; NSR, no ischemic changes

## 2021-10-28 NOTE — CONSULT NOTE ADULT - PROBLEM SELECTOR RECOMMENDATION 3
unclear etiology; pleuritic, positional; doubt ACS -- cardiac enzymes negative, EKG non-ischemic; no e/o PE or dissection on CTA; monitor EKGs, f/u TTE

## 2021-10-29 DIAGNOSIS — E78.5 HYPERLIPIDEMIA, UNSPECIFIED: ICD-10-CM

## 2021-10-29 LAB
ANION GAP SERPL CALC-SCNC: 10 MMOL/L — SIGNIFICANT CHANGE UP (ref 5–17)
BUN SERPL-MCNC: 15 MG/DL — SIGNIFICANT CHANGE UP (ref 7–23)
CALCIUM SERPL-MCNC: 9.4 MG/DL — SIGNIFICANT CHANGE UP (ref 8.4–10.5)
CHLORIDE SERPL-SCNC: 103 MMOL/L — SIGNIFICANT CHANGE UP (ref 96–108)
CO2 SERPL-SCNC: 25 MMOL/L — SIGNIFICANT CHANGE UP (ref 22–31)
CREAT SERPL-MCNC: 0.83 MG/DL — SIGNIFICANT CHANGE UP (ref 0.5–1.3)
GLUCOSE SERPL-MCNC: 120 MG/DL — HIGH (ref 70–99)
HCT VFR BLD CALC: 46.9 % — SIGNIFICANT CHANGE UP (ref 39–50)
HGB BLD-MCNC: 15.1 G/DL — SIGNIFICANT CHANGE UP (ref 13–17)
MAGNESIUM SERPL-MCNC: 2 MG/DL — SIGNIFICANT CHANGE UP (ref 1.6–2.6)
MCHC RBC-ENTMCNC: 30.6 PG — SIGNIFICANT CHANGE UP (ref 27–34)
MCHC RBC-ENTMCNC: 32.2 GM/DL — SIGNIFICANT CHANGE UP (ref 32–36)
MCV RBC AUTO: 95.1 FL — SIGNIFICANT CHANGE UP (ref 80–100)
NRBC # BLD: 0 /100 WBCS — SIGNIFICANT CHANGE UP (ref 0–0)
PHOSPHATE SERPL-MCNC: 3.2 MG/DL — SIGNIFICANT CHANGE UP (ref 2.5–4.5)
PLATELET # BLD AUTO: 275 K/UL — SIGNIFICANT CHANGE UP (ref 150–400)
POTASSIUM SERPL-MCNC: 4.6 MMOL/L — SIGNIFICANT CHANGE UP (ref 3.5–5.3)
POTASSIUM SERPL-SCNC: 4.6 MMOL/L — SIGNIFICANT CHANGE UP (ref 3.5–5.3)
RBC # BLD: 4.93 M/UL — SIGNIFICANT CHANGE UP (ref 4.2–5.8)
RBC # FLD: 12.6 % — SIGNIFICANT CHANGE UP (ref 10.3–14.5)
SODIUM SERPL-SCNC: 138 MMOL/L — SIGNIFICANT CHANGE UP (ref 135–145)
TROPONIN T SERPL-MCNC: 0.01 NG/ML — SIGNIFICANT CHANGE UP (ref 0–0.01)
WBC # BLD: 7.67 K/UL — SIGNIFICANT CHANGE UP (ref 3.8–10.5)
WBC # FLD AUTO: 7.67 K/UL — SIGNIFICANT CHANGE UP (ref 3.8–10.5)

## 2021-10-29 PROCEDURE — 70450 CT HEAD/BRAIN W/O DYE: CPT | Mod: 26

## 2021-10-29 PROCEDURE — 99233 SBSQ HOSP IP/OBS HIGH 50: CPT

## 2021-10-29 PROCEDURE — 95718 EEG PHYS/QHP 2-12 HR W/VEEG: CPT

## 2021-10-29 RX ADMIN — ATORVASTATIN CALCIUM 80 MILLIGRAM(S): 80 TABLET, FILM COATED ORAL at 22:25

## 2021-10-29 RX ADMIN — Medication 650 MILLIGRAM(S): at 16:22

## 2021-10-29 RX ADMIN — Medication 2 MILLIGRAM(S): at 14:06

## 2021-10-29 RX ADMIN — Medication 650 MILLIGRAM(S): at 14:06

## 2021-10-29 RX ADMIN — ENOXAPARIN SODIUM 40 MILLIGRAM(S): 100 INJECTION SUBCUTANEOUS at 22:25

## 2021-10-29 RX ADMIN — Medication 81 MILLIGRAM(S): at 14:05

## 2021-10-29 NOTE — PROGRESS NOTE ADULT - SUBJECTIVE AND OBJECTIVE BOX
Patient is a 41y old  Male who presents with a chief complaint of left sided numbness, chest pain (29 Oct 2021 11:34)      INTERVAL HPI/OVERNIGHT EVENTS:    Pt. seen and examined earlier today  Events noted; Pt. c/o L shoulder pain and worsening of LUE numbness after working w/ PT/OT; sx gradually improved  Positional/pleuritic CP improving  Pt. denies F/C, SOB, hemoptysis    Review of Systems: 12 point review of systems otherwise negative    MEDICATIONS  (STANDING):  aspirin enteric coated 81 milliGRAM(s) Oral daily  atorvastatin 80 milliGRAM(s) Oral at bedtime  enoxaparin Injectable 40 milliGRAM(s) SubCutaneous at bedtime  nicotine  Polacrilex Lozenge 2 milliGRAM(s) Oral daily    MEDICATIONS  (PRN):  acetaminophen     Tablet .. 650 milliGRAM(s) Oral every 6 hours PRN Moderate Pain (4 - 6)      Allergies    No Known Allergies    Intolerances          Vital Signs Last 24 Hrs  T(C): 36.8 (29 Oct 2021 14:12), Max: 36.8 (29 Oct 2021 14:12)  T(F): 98.2 (29 Oct 2021 14:12), Max: 98.2 (29 Oct 2021 14:12)  HR: 90 (29 Oct 2021 11:04) (70 - 90)  BP: 140/84 (29 Oct 2021 11:04) (100/66 - 144/92)  BP(mean): 106 (29 Oct 2021 11:04) (78 - 110)  RR: 18 (29 Oct 2021 09:26) (17 - 18)  SpO2: 100% (29 Oct 2021 09:26) (95% - 100%)  CAPILLARY BLOOD GLUCOSE          10-28 @ 07:01  -  10-29 @ 07:00  --------------------------------------------------------  IN: 700 mL / OUT: 200 mL / NET: 500 mL        Physical Exam:  (earlier today)  Daily     Daily   General:  well-appearing in NAD  HEENT:  MMM  CV:  RRR, no JVD  Lungs:  CTA B/L  Abdomen:  soft NT ND  Extremities:  no edema B/L LE  Skin:  WWP  Neuro:  AAOx3, no dysarthria, +decreased sensation to light touch on L side    LABS:                        15.1   7.67  )-----------( 275      ( 29 Oct 2021 08:06 )             46.9     10-29    138  |  103  |  15  ----------------------------<  120<H>  4.6   |  25  |  0.83    Ca    9.4      29 Oct 2021 08:06  Phos  3.2     10-29  Mg     2.0     10-29

## 2021-10-29 NOTE — PHYSICAL THERAPY INITIAL EVALUATION ADULT - GAIT DEVIATIONS NOTED, PT EVAL
patient with left side flexion of head during ambulation (possibly due to heat packs on left shoulder while ambulating)

## 2021-10-29 NOTE — PHYSICAL THERAPY INITIAL EVALUATION ADULT - MODALITIES TREATMENT COMMENTS
Patient with slight decreased sensation to light touch left side of face. All other cranial nerves intact. H test: able to track smoothly to all fields. Quadrant teat: grossly WNL

## 2021-10-29 NOTE — PHYSICAL THERAPY INITIAL EVALUATION ADULT - PERTINENT HX OF CURRENT PROBLEM, REHAB EVAL
41y Male with 39 pack year hx, no PMHx, last seen PCP 3 years ago, presents to [facility] for chest pain/SOB x 3 days and acute onset of left sided numbness . CTH neg for acute infarct

## 2021-10-29 NOTE — OCCUPATIONAL THERAPY INITIAL EVALUATION ADULT - DIAGNOSIS, OT EVAL
Pt presents with L pronator drift and L UE dysmetria however demonstrating at baseline/ WFL for ADLs and functional mobility.

## 2021-10-29 NOTE — PROGRESS NOTE ADULT - PROBLEM SELECTOR PLAN 3
unclear etiology; pleuritic, positional; doubt ACS -- cardiac enzymes negative x2, EKG non-ischemic; TTE unremarkable; no e/o PE or dissection on CTA; monitor EKGs

## 2021-10-29 NOTE — OCCUPATIONAL THERAPY INITIAL EVALUATION ADULT - LIGHT TOUCH SENSATION, LUE, REHAB EVAL
While sitting EOB, pt reports recurrence of L facial/ UE/LE sensation; resolved after +2 minutes/within normal limits

## 2021-10-29 NOTE — PHYSICAL THERAPY INITIAL EVALUATION ADULT - COORDINATION ASSESSED, REHAB EVAL
Right UE/LE WNL. Left UE/LE slight dysmetria with finger to nose and heel to shin/finger to nose/heel to shin

## 2021-10-29 NOTE — PROGRESS NOTE ADULT - SUBJECTIVE AND OBJECTIVE BOX
Neurology Stroke Progress Note    INTERVAL HPI/OVERNIGHT EVENTS:  Patient seen and examined. No acute O/E. he was doing well until around 11:00 am. When he was starting workingwith PT/OT, he started to exp    MEDICATIONS  (STANDING):  aspirin enteric coated 81 milliGRAM(s) Oral daily  atorvastatin 80 milliGRAM(s) Oral at bedtime  enoxaparin Injectable 40 milliGRAM(s) SubCutaneous at bedtime  nicotine  Polacrilex Lozenge 2 milliGRAM(s) Oral daily    MEDICATIONS  (PRN):  acetaminophen     Tablet .. 650 milliGRAM(s) Oral every 6 hours PRN Moderate Pain (4 - 6)      Allergies    No Known Allergies    Intolerances        Vital Signs Last 24 Hrs  T(C): 36.5 (29 Oct 2021 09:26), Max: 36.9 (28 Oct 2021 13:56)  T(F): 97.7 (29 Oct 2021 09:26), Max: 98.5 (28 Oct 2021 13:56)  HR: 90 (29 Oct 2021 11:04) (70 - 90)  BP: 140/84 (29 Oct 2021 11:04) (100/66 - 144/92)  BP(mean): 106 (29 Oct 2021 11:04) (78 - 110)  RR: 18 (29 Oct 2021 09:26) (17 - 18)  SpO2: 100% (29 Oct 2021 09:26) (95% - 100%)    Physical exam:  LABS:                        15.1   7.67  )-----------( 275      ( 29 Oct 2021 08:06 )             46.9     10-29    138  |  103  |  15  ----------------------------<  120<H>  4.6   |  25  |  0.83    Ca    9.4      29 Oct 2021 08:06  Phos  3.2     10-29  Mg     2.0     10-29    TPro  7.6  /  Alb  3.9  /  TBili  0.7  /  DBili  x   /  AST  15  /  ALT  20  /  AlkPhos  84  10-27    PT/INR - ( 27 Oct 2021 12:19 )   PT: 12.0 sec;   INR: 1.02          PTT - ( 27 Oct 2021 12:19 )  PTT:37.8 sec      RADIOLOGY & ADDITIONAL TESTS:     Neurology Stroke Progress Note    INTERVAL HPI/OVERNIGHT EVENTS:  Patient seen and examined. No acute O/E. he was doing well until around 11:00 am. When he was starting workingwith PT/OT, he started to experience left shoulder pain, left UE numbness, which started to resolve on its own. Awaiting CT head, Trop and EKG.     MEDICATIONS  (STANDING):  aspirin enteric coated 81 milliGRAM(s) Oral daily  atorvastatin 80 milliGRAM(s) Oral at bedtime  enoxaparin Injectable 40 milliGRAM(s) SubCutaneous at bedtime  nicotine  Polacrilex Lozenge 2 milliGRAM(s) Oral daily    MEDICATIONS  (PRN):  acetaminophen     Tablet .. 650 milliGRAM(s) Oral every 6 hours PRN Moderate Pain (4 - 6)    Allergies  No Known Allergies    Vital Signs Last 24 Hrs  T(C): 36.5 (29 Oct 2021 09:26), Max: 36.9 (28 Oct 2021 13:56)  T(F): 97.7 (29 Oct 2021 09:26), Max: 98.5 (28 Oct 2021 13:56)  HR: 90 (29 Oct 2021 11:04) (70 - 90)  BP: 140/84 (29 Oct 2021 11:04) (100/66 - 144/92)  BP(mean): 106 (29 Oct 2021 11:04) (78 - 110)  RR: 18 (29 Oct 2021 09:26) (17 - 18)  SpO2: 100% (29 Oct 2021 09:26) (95% - 100%)    Physical exam:  LABS:                        15.1   7.67  )-----------( 275      ( 29 Oct 2021 08:06 )             46.9     10-29    138  |  103  |  15  ----------------------------<  120<H>  4.6   |  25  |  0.83    Ca    9.4      29 Oct 2021 08:06  Phos  3.2     10-29  Mg     2.0     10-29  TPro  7.6  /  Alb  3.9  /  TBili  0.7  /  DBili  x   /  AST  15  /  ALT  20  /  AlkPhos  84  10-27  PT/INR - ( 27 Oct 2021 12:19 )   PT: 12.0 sec;   INR: 1.02     PTT - ( 27 Oct 2021 12:19 )  PTT:37.8 sec      RADIOLOGY & ADDITIONAL TESTS:  < from: Echocardiogram w/ Bubble and Doppler (10.28.21 @ 10:31) >   Normal left and right ventricular size and systolic function.   2. Injection of agitated saline via a peripheral vein reveals no evidence of a right-to-left shunt.   3. No significant valvular disease.   4. No evidence of pulmonary hypertension.   5. No pericardial effusion.   6. No prior echo is available for comparison.    < end of copied text >       Neurology Stroke Progress Note    INTERVAL HPI/OVERNIGHT EVENTS:  Patient seen and examined. No acute O/E. he was doing well until around 11:00 am. When he was starting workingwith PT/OT, he started to experience left shoulder pain, left UE numbness, which started to resolve on its own. Awaiting CT head, Trop and EKG.     MEDICATIONS  (STANDING):  aspirin enteric coated 81 milliGRAM(s) Oral daily  atorvastatin 80 milliGRAM(s) Oral at bedtime  enoxaparin Injectable 40 milliGRAM(s) SubCutaneous at bedtime  nicotine  Polacrilex Lozenge 2 milliGRAM(s) Oral daily    MEDICATIONS  (PRN):  acetaminophen     Tablet .. 650 milliGRAM(s) Oral every 6 hours PRN Moderate Pain (4 - 6)    Allergies  No Known Allergies    Vital Signs Last 24 Hrs  T(C): 36.5 (29 Oct 2021 09:26), Max: 36.9 (28 Oct 2021 13:56)  T(F): 97.7 (29 Oct 2021 09:26), Max: 98.5 (28 Oct 2021 13:56)  HR: 90 (29 Oct 2021 11:04) (70 - 90)  BP: 140/84 (29 Oct 2021 11:04) (100/66 - 144/92)  BP(mean): 106 (29 Oct 2021 11:04) (78 - 110)  RR: 18 (29 Oct 2021 09:26) (17 - 18)  SpO2: 100% (29 Oct 2021 09:26) (95% - 100%)    Physical exam:  General: No acute distress, awake and alert  Eyes: Anicteric sclerae, moist conjunctivae, see below for CNs  Neck: trachea midline, FROM, supple, no thyromegaly or lymphadenopathy  Cardiovascular: Regular rate and rhythm, no murmurs, rubs, or gallops. No carotid bruits.   Pulmonary: Anterior breath sounds clear bilaterally, no crackles or wheezing. No use of accessory muscles  GI: Abdomen soft, non-distended, non-tender  Extremities: Radial and DP pulses +2, no edema    Neurologic:  -Mental status: Awake, alert, oriented to person, place, and time. Speech is fluent with intact naming, repetition, and comprehension, no dysarthria. Recent and remote memory intact. Follows commands. Attention/concentration intact. Fund of knowledge appropriate.  -Cranial nerves:   II: Visual fields are full to confrontation.  III, IV, VI: Extraocular movements are intact without nystagmus. Pupils equally round and reactive to light  V:  Facial sensation V1-V3 decreased on left side  VII: Subtle left facial droop noted.  VIII: Hearing is bilaterally intact to finger rub  IX, X: Uvula is midline and soft palate rises symmetrically  XI: Head turning and shoulder shrug are intact.  XII: Tongue protrudes midline  Motor: Normal bulk and tone. No pronator drift. Strength bilateral upper extremity 5/5, bilateral lower extremities 5/5.  Rapid alternating movements intact and symmetric  Sensation: Decreased to light touch on left side. No neglect or extinction on double simultaneous testing.  Coordination: No dysmetria on finger-to-nose and heel-to-shin bilaterally  Reflexes: Downgoing toes bilaterally     LABS:                        15.1   7.67  )-----------( 275      ( 29 Oct 2021 08:06 )             46.9     10-29    138  |  103  |  15  ----------------------------<  120<H>  4.6   |  25  |  0.83    Ca    9.4      29 Oct 2021 08:06  Phos  3.2     10-29  Mg     2.0     10-29  TPro  7.6  /  Alb  3.9  /  TBili  0.7  /  DBili  x   /  AST  15  /  ALT  20  /  AlkPhos  84  10-27  PT/INR - ( 27 Oct 2021 12:19 )   PT: 12.0 sec;   INR: 1.02     PTT - ( 27 Oct 2021 12:19 )  PTT:37.8 sec      RADIOLOGY & ADDITIONAL TESTS:  < from: Echocardiogram w/ Bubble and Doppler (10.28.21 @ 10:31) >   Normal left and right ventricular size and systolic function.   2. Injection of agitated saline via a peripheral vein reveals no evidence of a right-to-left shunt.   3. No significant valvular disease.   4. No evidence of pulmonary hypertension.   5. No pericardial effusion.   6. No prior echo is available for comparison.    < end of copied text >

## 2021-10-29 NOTE — OCCUPATIONAL THERAPY INITIAL EVALUATION ADULT - MODALITIES TREATMENT COMMENTS
Cranial Nerves II - XII: II: PERRLA; visual fields are full to confrontation III, IV, V: facial sensation decreased on L side to light touch V1-V3 b/l VII: no ptosis, no facial droop, symmetric eyebrow raise and closure VIII: hearing intact to finger rub b/l  XI: head turning and shoulder shrug intact b/l XII: tongue protrusion midline

## 2021-10-29 NOTE — PHYSICAL THERAPY INITIAL EVALUATION ADULT - GENERAL OBSERVATIONS, REHAB EVAL
As per RN rad patient cleared for PT/OOB. Received patient supine + telemetry, heplock, in NAD. Reporting numbness left side of face as on admission. Seen with VERONICA Snow.

## 2021-10-29 NOTE — OCCUPATIONAL THERAPY INITIAL EVALUATION ADULT - MD ORDER
41y Male with 39 pack year hx, no PMHx, last seen PCP 3 years ago, presents to [facility] for chest pain/SOB x 3 days and acute onset of left sided numbness. Pt states he was having chest pain on Sunday along with some SOB "while doing nothing" and stated his pain was centrally located.

## 2021-10-29 NOTE — OCCUPATIONAL THERAPY INITIAL EVALUATION ADULT - VISUAL ASSESSMENT: TRACKING
While sitting EOB pt reports recurrence of L blurriness; however resolved after a few minutes/normal

## 2021-10-29 NOTE — PROGRESS NOTE ADULT - ASSESSMENT
41y Male with 39 pack year hx, no PMHx, last seen PCP 3 years ago, presents to [facility] for chest pain/SOB x 3 days and acute onset of left sided numbness starting at 0630 10/27, transferred to [facility] for stroke workup and r/o. NIHSS on arrival is 2 (left facial, left sensation deficit), ASPECT 10. Found to have lung mass suspicious for malignancy on Chest CT PE protocol. Admitted to stroke tele. No acute overnight event. Pending MRI, TTE    Neuro  #CVA workup  - continue aspirin 81mg daily  - continue atorvastatin 80mg daily  - q4hr stroke neuro checks and vitals  - obtain MRI Brain w contrast to r/o mets along with stroke.  - Stroke Code HCT Results: neg  - Stroke Code CTA Results: neg  - Stroke education    Cards  - BP goal<140 mmHg.  - obtain TTE with bubble  - Stroke Code EKG Results: NSR    #HLD  - high dose statin as above in CVA  - LDL results: 152    Pulm  #Lung mass (incidental finding):   - call provider if SPO2 < 94%  - spiculated cavitary pulmonary nodule in right upper lobe 0.9cm, possible malignant adenopathy.  - Spoke to patient about the mass. Pending further consult to set up Outpatient follow up.    GI  #Nutrition/Fluids/Electrolytes   - replete K<4 and Mg <2  - Diet: regular    Renal  - monitor I&Os    Infectious Disease  - monitor and trend WBCs    Endocrine  #DM  - A1C results: 5.4    - TSH results: pending    DVT Prophylaxis  - lovenox sq for DVT prophylaxis     Dispo: pending PT/OT eval     Discussed with Neurology Attending Dr. Isaac    IDR: Discussed plan, GOC with CM,SW, PT, OT.   41y Male with 39 pack year hx, no PMHx, last seen PCP 3 years ago, presents to [facility] for chest pain/SOB x 3 days and acute onset of left sided numbness starting at 0630 10/27, transferred to [facility] for stroke workup and r/o. NIHSS on arrival is 2 (left facial, left sensation deficit), ASPECT 10. Found to have lung mass suspicious for malignancy on Chest CT PE protocol. Admitted to stroke tele. No acute overnight event. patient had an episode of blurry vision, left sided numbness during OT/PT. Repeat EKG, TRop reviewed, CTH: awaiting. During the time of the episode subtle left UE dysmetria and left UE drifting noted which resolved spontaneously.    Neuro  #R/O Stroke vs Mets:  - continue aspirin 81mg daily  - continue atorvastatin 80mg daily  - q4hr stroke neuro checks and vitals  - obtain MRI Brain w contrast to r/o mets along with stroke.  - VEEG to r/o seizure in view of the episodic nature of the symptom.  - Stroke Code HCT Results: neg  - Stroke Code CTA Results: neg  - Stroke education      Cards  - BP goal<140 mmHg.  - TTE with bubble : Normal EF, No PFO.  - Stroke Code EKG Results: NSR    #HLD  - high dose statin as above in CVA  - LDL results: 152    Pulm  #Lung mass (incidental finding):   - call provider if SPO2 < 94%  - spiculated cavitary pulmonary nodule in right upper lobe 0.9cm, possible malignant adenopathy.  - Spoke to patient about the mass. Pending further consult to set up Outpatient follow up.    GI  #Nutrition/Fluids/Electrolytes   - replete K<4 and Mg <2  - Diet: regular    Renal  - monitor I&Os    Infectious Disease  - monitor and trend WBCs    Endocrine  #DM  - A1C results: 5.4    - TSH results: 1.8    DVT Prophylaxis  - lovenox sq for DVT prophylaxis     Dispo: Home, cleared by PT/OT  Discussed with Neurology Attending Dr. Isaac    IDR: Discussed plan, GOC with CM,SW, PT, OT.

## 2021-10-29 NOTE — OCCUPATIONAL THERAPY INITIAL EVALUATION ADULT - LIVES WITH, PROFILE
Pt lives with wife in apt with no stairs to enter. Pt at baseline is ind for ADLs and functional mobility. +shower/tub/spouse

## 2021-10-30 LAB
ANION GAP SERPL CALC-SCNC: 8 MMOL/L — SIGNIFICANT CHANGE UP (ref 5–17)
BUN SERPL-MCNC: 19 MG/DL — SIGNIFICANT CHANGE UP (ref 7–23)
CALCIUM SERPL-MCNC: 9.6 MG/DL — SIGNIFICANT CHANGE UP (ref 8.4–10.5)
CHLORIDE SERPL-SCNC: 106 MMOL/L — SIGNIFICANT CHANGE UP (ref 96–108)
CO2 SERPL-SCNC: 28 MMOL/L — SIGNIFICANT CHANGE UP (ref 22–31)
CREAT SERPL-MCNC: 0.96 MG/DL — SIGNIFICANT CHANGE UP (ref 0.5–1.3)
GLUCOSE SERPL-MCNC: 107 MG/DL — HIGH (ref 70–99)
HCT VFR BLD CALC: 47.8 % — SIGNIFICANT CHANGE UP (ref 39–50)
HGB BLD-MCNC: 15.6 G/DL — SIGNIFICANT CHANGE UP (ref 13–17)
MAGNESIUM SERPL-MCNC: 2.2 MG/DL — SIGNIFICANT CHANGE UP (ref 1.6–2.6)
MCHC RBC-ENTMCNC: 31 PG — SIGNIFICANT CHANGE UP (ref 27–34)
MCHC RBC-ENTMCNC: 32.6 GM/DL — SIGNIFICANT CHANGE UP (ref 32–36)
MCV RBC AUTO: 95 FL — SIGNIFICANT CHANGE UP (ref 80–100)
NRBC # BLD: 0 /100 WBCS — SIGNIFICANT CHANGE UP (ref 0–0)
PHOSPHATE SERPL-MCNC: 3.6 MG/DL — SIGNIFICANT CHANGE UP (ref 2.5–4.5)
PLATELET # BLD AUTO: 267 K/UL — SIGNIFICANT CHANGE UP (ref 150–400)
POTASSIUM SERPL-MCNC: 4.8 MMOL/L — SIGNIFICANT CHANGE UP (ref 3.5–5.3)
POTASSIUM SERPL-SCNC: 4.8 MMOL/L — SIGNIFICANT CHANGE UP (ref 3.5–5.3)
RBC # BLD: 5.03 M/UL — SIGNIFICANT CHANGE UP (ref 4.2–5.8)
RBC # FLD: 12.6 % — SIGNIFICANT CHANGE UP (ref 10.3–14.5)
SODIUM SERPL-SCNC: 142 MMOL/L — SIGNIFICANT CHANGE UP (ref 135–145)
WBC # BLD: 6.96 K/UL — SIGNIFICANT CHANGE UP (ref 3.8–10.5)
WBC # FLD AUTO: 6.96 K/UL — SIGNIFICANT CHANGE UP (ref 3.8–10.5)

## 2021-10-30 PROCEDURE — 95720 EEG PHY/QHP EA INCR W/VEEG: CPT

## 2021-10-30 PROCEDURE — 70552 MRI BRAIN STEM W/DYE: CPT | Mod: 26

## 2021-10-30 PROCEDURE — 99233 SBSQ HOSP IP/OBS HIGH 50: CPT

## 2021-10-30 PROCEDURE — 71260 CT THORAX DX C+: CPT | Mod: 26

## 2021-10-30 RX ORDER — NICOTINE POLACRILEX 2 MG
2 GUM BUCCAL
Refills: 0 | Status: DISCONTINUED | OUTPATIENT
Start: 2021-10-30 | End: 2021-10-31

## 2021-10-30 RX ADMIN — ATORVASTATIN CALCIUM 80 MILLIGRAM(S): 80 TABLET, FILM COATED ORAL at 21:35

## 2021-10-30 RX ADMIN — Medication 81 MILLIGRAM(S): at 12:05

## 2021-10-30 RX ADMIN — Medication 2 MILLIGRAM(S): at 12:05

## 2021-10-30 NOTE — PROGRESS NOTE ADULT - SUBJECTIVE AND OBJECTIVE BOX
Neurology Stroke Progress Note    INTERVAL HPI/OVERNIGHT EVENTS:  Patient seen and examined this morning. EEG in place  patient states that he is feeling good today, state that he was able to get some sleep last night. Has not been walking around much on his home but when he does walk to the bathroom, does not feel unsteady.  Denies shoulder pain at the moment that occurred yesterday.     repeat CTH yesterday stable.     MEDICATIONS  (STANDING):  aspirin enteric coated 81 milliGRAM(s) Oral daily  atorvastatin 80 milliGRAM(s) Oral at bedtime  enoxaparin Injectable 40 milliGRAM(s) SubCutaneous at bedtime  nicotine  Polacrilex Lozenge 2 milliGRAM(s) Oral daily    MEDICATIONS  (PRN):  acetaminophen     Tablet .. 650 milliGRAM(s) Oral every 6 hours PRN Moderate Pain (4 - 6)      Allergies    No Known Allergies    Intolerances        Vital Signs Last 24 Hrs  T(C): 36.5 (30 Oct 2021 04:30), Max: 37 (29 Oct 2021 17:27)  T(F): 97.7 (30 Oct 2021 04:30), Max: 98.6 (29 Oct 2021 17:27)  HR: 69 (30 Oct 2021 04:20) (62 - 90)  BP: 92/56 (30 Oct 2021 04:20) (92/56 - 144/92)  BP(mean): 69 (30 Oct 2021 04:20) (69 - 110)  RR: 16 (30 Oct 2021 04:20) (16 - 18)  SpO2: 99% (30 Oct 2021 00:45) (99% - 100%)    Physical exam:  General: No acute distress, awake and alert  Eyes: Anicteric sclerae, moist conjunctivae, see below for CNs  Neck: trachea midline, FROM, supple, no thyromegaly or lymphadenopathy  Cardiovascular: Regular rate and rhythm, no murmurs, rubs, or gallops. No carotid bruits.   Pulmonary: Anterior breath sounds clear bilaterally, no crackles or wheezing. No use of accessory muscles  GI: Abdomen soft, non-distended, non-tender  Extremities: Radial and DP pulses +2, no edema    Neurologic:  -Mental status: Awake, alert, oriented to person, place, and time. Speech is fluent with intact naming, repetition, and comprehension, no dysarthria. Recent and remote memory intact. Follows commands. Attention/concentration intact. Fund of knowledge appropriate.  -Cranial nerves:   II: Visual fields are full to confrontation.  III, IV, VI: Extraocular movements are intact without nystagmus. Pupils equally round and reactive to light  V:  Facial sensation V1-V3 decreased on left side  VII: face symmetric  VIII: Hearing is bilaterally intact to finger rub  IX, X: Uvula is midline and soft palate rises symmetrically  XI: Head turning and shoulder shrug are intact.  XII: Tongue protrudes midline  Motor: Normal bulk and tone. No pronator drift. Strength bilateral upper extremity 5/5, bilateral lower extremities 5/5.  Rapid alternating movements intact and symmetric  Sensation: Sensation intact to light touch. No neglect or extinction on double simultaneous testing.  Coordination: No dysmetria on finger-to-nose and heel-to-shin bilaterally  Reflexes: Downgoing toes bilaterally       LABS:                        15.6   6.96  )-----------( 267      ( 30 Oct 2021 06:43 )             47.8     10-30    142  |  106  |  19  ----------------------------<  107<H>  4.8   |  28  |  0.96    Ca    9.6      30 Oct 2021 06:43  Phos  3.6     10-30  Mg     2.2     10-30            RADIOLOGY & ADDITIONAL TESTS:  CT Head No Cont (10.29.21 @ 13:19) >    IMPRESSION: No intracranial hemorrhage or acute transcortical infarct    Echocardiogram w/ Bubble and Doppler (10.28.21 @ 10:31) >  CONCLUSIONS:     1. Normal left and right ventricular size and systolic function.   2. Injection of agitated saline via a peripheral vein reveals no evidence of a right-to-left shunt.   3. No significant valvular disease.   4. No evidence of pulmonary hypertension.   5. No pericardial effusion.   6. No prior echo is available for comparison.   Neurology Stroke Progress Note    INTERVAL HPI/OVERNIGHT EVENTS:  Patient seen and examined this morning. EEG in place  patient states that he is feeling good today, state that he was able to get some sleep last night. Has not been walking around much on his home but when he does walk to the bathroom, does not feel unsteady.  Denies shoulder pain at the moment that occurred yesterday.     repeat CTH yesterday stable.     Around 10am, patient states that he had an episode of tingling in his left face and arm similar to yesterday's episode. States that he lifted up his arms and noticed that his left arm drifted. He pushed event button on EEG during this time.     MEDICATIONS  (STANDING):  aspirin enteric coated 81 milliGRAM(s) Oral daily  atorvastatin 80 milliGRAM(s) Oral at bedtime  enoxaparin Injectable 40 milliGRAM(s) SubCutaneous at bedtime  nicotine  Polacrilex Lozenge 2 milliGRAM(s) Oral daily    MEDICATIONS  (PRN):  acetaminophen     Tablet .. 650 milliGRAM(s) Oral every 6 hours PRN Moderate Pain (4 - 6)    Allergies    No Known Allergies    Intolerances        Vital Signs Last 24 Hrs  T(C): 36.5 (30 Oct 2021 04:30), Max: 37 (29 Oct 2021 17:27)  T(F): 97.7 (30 Oct 2021 04:30), Max: 98.6 (29 Oct 2021 17:27)  HR: 69 (30 Oct 2021 04:20) (62 - 90)  BP: 92/56 (30 Oct 2021 04:20) (92/56 - 144/92)  BP(mean): 69 (30 Oct 2021 04:20) (69 - 110)  RR: 16 (30 Oct 2021 04:20) (16 - 18)  SpO2: 99% (30 Oct 2021 00:45) (99% - 100%)    Physical exam:  General: No acute distress, awake and alert  Eyes: Anicteric sclerae, moist conjunctivae, see below for CNs  Neck: trachea midline, FROM, supple, no thyromegaly or lymphadenopathy  Cardiovascular: Regular rate and rhythm, no murmurs, rubs, or gallops. No carotid bruits.   Pulmonary: Anterior breath sounds clear bilaterally, no crackles or wheezing. No use of accessory muscles  GI: Abdomen soft, non-distended, non-tender  Extremities: Radial and DP pulses +2, no edema    Neurologic:  -Mental status: Awake, alert, oriented to person, place, and time. Speech is fluent with intact naming, repetition, and comprehension, no dysarthria. Recent and remote memory intact. Follows commands. Attention/concentration intact. Fund of knowledge appropriate.  -Cranial nerves:   II: Visual fields are full to confrontation.  III, IV, VI: Extraocular movements are intact without nystagmus. Pupils equally round and reactive to light  V:  Facial sensation V1-V3 decreased on left side  VII: face symmetric  VIII: Hearing is bilaterally intact to finger rub  IX, X: Uvula is midline and soft palate rises symmetrically  XI: Head turning and shoulder shrug are intact.  XII: Tongue protrudes midline  Motor: Normal bulk and tone. No pronator drift. Strength bilateral upper extremity 5/5, bilateral lower extremities 5/5.   Around 10am, patient with left arm drift.   Sensation: Sensation intact to light touch. No neglect or extinction on double simultaneous testing.  Coordination: No dysmetria on finger-to-nose and heel-to-shin bilaterally  Reflexes: Downgoing toes bilaterally     LABS:                        15.6   6.96  )-----------( 267      ( 30 Oct 2021 06:43 )             47.8     10-30    142  |  106  |  19  ----------------------------<  107<H>  4.8   |  28  |  0.96    Ca    9.6      30 Oct 2021 06:43  Phos  3.6     10-30  Mg     2.2     10-30    RADIOLOGY & ADDITIONAL TESTS:  CT Head No Cont (10.29.21 @ 13:19) >    IMPRESSION: No intracranial hemorrhage or acute transcortical infarct    Echocardiogram w/ Bubble and Doppler (10.28.21 @ 10:31) >  CONCLUSIONS:     1. Normal left and right ventricular size and systolic function.   2. Injection of agitated saline via a peripheral vein reveals no evidence of a right-to-left shunt.   3. No significant valvular disease.   4. No evidence of pulmonary hypertension.   5. No pericardial effusion.   6. No prior echo is available for comparison.

## 2021-10-30 NOTE — PROGRESS NOTE ADULT - ASSESSMENT
41y Male with 39 pack year hx, no PMHx, last seen PCP 3 years ago, presents to [facility] for chest pain/SOB x 3 days and acute onset of left sided numbness starting at 0630 10/27, transferred to [facility] for stroke workup and r/o. NIHSS on arrival is 2 (left facial, left sensation deficit), ASPECT 10. Found to have lung mass suspicious for malignancy on Chest CT PE protocol. Admitted to stroke tele. No acute overnight event. patient had an episode of blurry vision, left sided numbness during OT/PT on 10/29. Repeat EKG nml, TRop nmp, CTH: stable. During the time of the episode subtle left UE dysmetria and left UE drifting noted which resolved spontaneously.  Today 10/30 patient with left check decreased sensation which patient presented with.   Pending MRI brain with PRASHANTH        Neuro  #R/O Stroke vs Mets:  - continue aspirin 81mg daily  - continue atorvastatin 80mg daily  - q4hr stroke neuro checks and vitals  - obtain MRI Brain w contrast to r/o mets along with stroke.  - f/u VEEG to r/o seizure in view of the episodic nature of the symptom on 10/29   - Stroke Code HCT Results: neg  - Stroke Code CTA Results: neg  - Stroke education      Cards  - BP goal<140 mmHg.  - TTE with bubble : Normal EF, No PFO.  - Stroke Code EKG Results: NSR    #HLD  - high dose statin as above in CVA  - LDL results: 152    Pulm  #Lung mass (incidental finding):   - call provider if SPO2 < 94%  - spiculated cavitary pulmonary nodule in right upper lobe 0.9cm, possible malignant adenopathy.  - Spoke to patient about the mass. Pending further consult to set up Outpatient follow up.    GI  #Nutrition/Fluids/Electrolytes   - replete K<4 and Mg <2  - Diet: regular    Renal  - monitor I&Os    Infectious Disease  - monitor and trend WBCs    Endocrine  #DM  - A1C results: 5.4  - TSH results: 1.8    DVT Prophylaxis  - lovenox sq for DVT prophylaxis     Dispo: Home, cleared by PT/OT  Discussed with Neurology Attending Dr. Isaac    IDR: Discussed plan, GOC with CM,SW, PT, OT.       41y Male with 39 pack year hx, no PMHx, last seen PCP 3 years ago, presents to [facility] for chest pain/SOB x 3 days and acute onset of left sided numbness starting at 0630 10/27, transferred to [facility] for stroke workup and r/o. NIHSS on arrival is 2 (left facial, left sensation deficit), ASPECT 10. Found to have lung mass suspicious for malignancy on Chest CT PE protocol. Admitted to stroke tele. No acute overnight event. patient had an episode of blurry vision, left sided numbness during OT/PT on 10/29. Repeat EKG nml, TRop nmp, CTH: stable. During the time of the episode subtle left UE dysmetria and left UE drifting noted which resolved spontaneously.  Today 10/30 patient with left check decreased sensation which patient presented with.   Pending MRI brain with PRASHANTH    Neuro  #R/O Stroke vs Mets:  - continue aspirin 81mg daily  - continue atorvastatin 80mg daily  - q4hr stroke neuro checks and vitals  - obtain MRI Brain w contrast to r/o mets along with stroke.  - f/u VEEG to r/o seizure in view of the episodic nature of the symptom on 10/29   - will consider adding low dose Keppra 250mg dependent on EEG report  - disconnect EEG at 11am for MRI   - Stroke Code HCT Results: neg  - Stroke Code CTA Results: neg  - Stroke education    Cards  - BP goal<140 mmHg.  - TTE with bubble : Normal EF, No PFO.  - Stroke Code EKG Results: NSR    #HLD  - high dose statin as above in CVA  - LDL results: 152    Pulm  #Lung mass (incidental finding):   - call provider if SPO2 < 94%  - spiculated cavitary pulmonary nodule in right upper lobe 0.9cm, possible malignant adenopathy.  - Spoke to patient about the mass. Pending further consult to set up Outpatient follow up.  - plan for CT chest with IV contrast     GI  #Nutrition/Fluids/Electrolytes   - replete K<4 and Mg <2  - Diet: regular    Renal  - monitor I&Os    Infectious Disease  - monitor and trend WBCs    Endocrine  #DM  - A1C results: 5.4  - TSH results: 1.8    DVT Prophylaxis  - lovenox sq for DVT prophylaxis     Dispo: Home, cleared by PT/OT  Discussed with Neurology Attending Dr. Isaac    IDR: Discussed plan, GOC with CM,SW, PT, OT.

## 2021-10-30 NOTE — EEG REPORT - NS EEG TEXT BOX
[facility] Department of Neurology  Inpatient Continuous video-Electroencephalogram    Patient Name:	GUSTAVO HERNDON    :	1980  MRN:	[id number]    Study Start Date/Time:  10/29/2021, 5:02:52 PM  Study End Date/Time:    Referred by: Cecilia Isaac MD    Brief Clinical History:  GUSTAVO HERNDON is a 41y Male with 39 pack year hx, no PMHx, last seen PCP 3 years ago, presents to [facility] for chest pain/SOB x 3 days and acute onset of left sided numbness starting at 0630 10/27, transferred to [facility] for stroke workup and r/o. NIHSS on arrival is 2 (left facial, left sensation deficit), ASPECT 10. Found to have lung mass suspicious for malignancy on Chest CT PE protocol. Admitted to stroke    Diagnosis Code:   R56.9 convulsions/seizure  CPT: 46963 EEG with video 12-26h  Technical CPT: 57417 set-up +  25437 vEEG unmonitored 12-26h    Pertinent Medications:  none    Acquisition Details:  Electroencephalography was acquired using a minimum of 21 channels on an [employer] system v 8.5.1 with electrode placement according to the standard International 10-20 system following ACNS (American Clinical Neurophysiology Society) guidelines for Long-Term Video EEG monitoring.  Anterior temporal T1 and T2 electrodes were utilized whenever possible.   The Act[employer] Experience automated spike & seizure detections were all reviewed in detail, in addition to extensive portions of raw EEG.    Day 1: 10/29/2021 @ 5:02:52 PM to next morning @ 07:00 am  Background:  continuous, with predominantly alpha and beta frequencies.  Symmetry:  No persistent asymmetries of voltage or frequency.  Posterior Dominant Rhythm:  10 Hz symmetric, well-organized, and well-modulated.  Organization: Appropriate anterior-posterior gradient.  Voltage:  Normal (20+ uV)  Variability: Yes. 		Reactivity: Yes.  N2 sleep: Symmetric, synchronous spindles and K complexes.  Spontaneous Activity:  No epileptiform discharges.  Periodic/rhythmic activity:  None.  Events:  No electrographic seizures. Episodes of left sided numbness reported with no associated epileptiform abnormalities.  Provocations:  Hyperventilation and Photic stimulation: was not performed.    Daily Summary:    1)	Episodes of left sided numbness reported with no associated epileptiform abnormalities.  No epileptiform activity and no significant clinical events occurred.      Juana Bishop MD  Attending Neurologist, [facility]

## 2021-10-30 NOTE — PROGRESS NOTE ADULT - TIME BILLING
review of chart documentation and data; interview with patient; discussion of assessment plan with patient and multidisciplinary teams.
review of patient information including recent vital signs, labs, imaging, and notes; assessing, examining patient; updating patient/family; discussion and coordination of care with multidisciplinary team.
review of patient information including recent vital signs, labs, imaging, and notes; assessing, examining patient; updating patient/family; discussion and coordination of care with multidisciplinary team.
d/w Stroke attending, resident, and PA  will follow w/ you
Yes

## 2021-10-30 NOTE — PROGRESS NOTE ADULT - ATTENDING COMMENTS
The patient is a 41-year-old gentleman with tobacco dependence but otherwise unknown past medical history admitted with a 3-day history of left-sided symptoms that began with chest pain and dyspnea after which he developed left-sided pain and later left-sided numbness of the face, arm, leg that comes and goes.  He denies associated tingling, weakness, or adventious movement. He also noticed just yesterday left lower facial drooping.  He was initially evaluated at [facility] ED. Cardiac workup was unremarkable. CTH was negative. CTA and CTP also unremarkable. He was incidentally found to have 9 mm spiculated mass and lymphadenopathy on chest imaging. Given unclear nature of symptoms and need for MRI for clarification, he was transferred to [facility] for further eval. On ROS, he denies headaches, new neurological symptoms other than noted above. He has lost 15-20 lbs over the last year. No fevers/chills/night sweats.  TTE unremarkable.     Etiology of patient's symptoms is not entirely clear. ? Small thalamic stroke ("stuttering" lacune). Seizure possible. Given possible malignancy, will need to also rule out metastases.  MRI with and without pending. Continue aspirin; hold Plavix given unclear nature of symptoms and potential need for procedure in future (biopsy). COntinue statin for elevated LDL. Encourage smoking cessation.
The patient is a 41-year-old gentleman with tobacco dependence but otherwise unknown past medical history admitted with a 3-day history of left-sided symptoms that began with chest pain and dyspnea after which he developed left-sided pain and later intermittent left-sided numbness/tingling of the face, arm, leg and was found to have a left UN pattern facial droop. CTH was negative. CTA and CTP also unremarkable. He was incidentally found to have 9 mm spiculated mass and lymphadenopathy on chest imaging.     MRI with and without pending. Given recurrent episode of similar symptoms this AM associated with headache, will obtain repeat head CT and if negative, arrange for EEG.  Otherwise, continue aspirin; hold Plavix given unclear nature of symptoms and potential need for procedure in future (biopsy). Continue statin for elevated LDL. Encourage smoking cessation.
The patient is a 41-year-old gentleman with tobacco dependence but otherwise unknown past medical history admitted with a intermittent episodes of left-sided numbness/tingling of the face, arm, leg on the background of chest pain and was found to have a left UN pattern facial droop. CTH was negative. CTA and CTP also unremarkable. He was incidentally found to have 9 mm spiculated mass and lymphadenopathy on chest imaging.     EEG showed no seizures despite capturing at least two spells after which he was notably weaker on exam (prior head CT after one of spells was unremarkable). Unclear etiology. ? Migraine variant vs seizure not captured on EEG which seems more likely. We will await MRI for further clarification.  CT chest with IV contrast pending. Otherwise, continue aspirin; hold Plavix given unclear nature of symptoms and potential need for procedure in future (biopsy). Continue statin for elevated LDL. Encourage smoking cessation.

## 2021-10-31 ENCOUNTER — TRANSCRIPTION ENCOUNTER (OUTPATIENT)
Age: 41
End: 2021-10-31

## 2021-10-31 VITALS — TEMPERATURE: 98 F

## 2021-10-31 LAB
ANION GAP SERPL CALC-SCNC: 10 MMOL/L — SIGNIFICANT CHANGE UP (ref 5–17)
APTT BLD: 32.5 SEC — SIGNIFICANT CHANGE UP (ref 27.5–35.5)
BUN SERPL-MCNC: 18 MG/DL — SIGNIFICANT CHANGE UP (ref 7–23)
CALCIUM SERPL-MCNC: 9.6 MG/DL — SIGNIFICANT CHANGE UP (ref 8.4–10.5)
CHLORIDE SERPL-SCNC: 105 MMOL/L — SIGNIFICANT CHANGE UP (ref 96–108)
CO2 SERPL-SCNC: 25 MMOL/L — SIGNIFICANT CHANGE UP (ref 22–31)
CREAT SERPL-MCNC: 0.92 MG/DL — SIGNIFICANT CHANGE UP (ref 0.5–1.3)
GLUCOSE SERPL-MCNC: 109 MG/DL — HIGH (ref 70–99)
HCT VFR BLD CALC: 47.2 % — SIGNIFICANT CHANGE UP (ref 39–50)
HGB BLD-MCNC: 15.1 G/DL — SIGNIFICANT CHANGE UP (ref 13–17)
INR BLD: 0.97 — SIGNIFICANT CHANGE UP (ref 0.88–1.16)
MAGNESIUM SERPL-MCNC: 2.2 MG/DL — SIGNIFICANT CHANGE UP (ref 1.6–2.6)
MCHC RBC-ENTMCNC: 29.9 PG — SIGNIFICANT CHANGE UP (ref 27–34)
MCHC RBC-ENTMCNC: 32 GM/DL — SIGNIFICANT CHANGE UP (ref 32–36)
MCV RBC AUTO: 93.5 FL — SIGNIFICANT CHANGE UP (ref 80–100)
NRBC # BLD: 0 /100 WBCS — SIGNIFICANT CHANGE UP (ref 0–0)
PHOSPHATE SERPL-MCNC: 3.7 MG/DL — SIGNIFICANT CHANGE UP (ref 2.5–4.5)
PLATELET # BLD AUTO: 268 K/UL — SIGNIFICANT CHANGE UP (ref 150–400)
POTASSIUM SERPL-MCNC: 4.7 MMOL/L — SIGNIFICANT CHANGE UP (ref 3.5–5.3)
POTASSIUM SERPL-SCNC: 4.7 MMOL/L — SIGNIFICANT CHANGE UP (ref 3.5–5.3)
PROTHROM AB SERPL-ACNC: 11.6 SEC — SIGNIFICANT CHANGE UP (ref 10.6–13.6)
RBC # BLD: 5.05 M/UL — SIGNIFICANT CHANGE UP (ref 4.2–5.8)
RBC # FLD: 12.6 % — SIGNIFICANT CHANGE UP (ref 10.3–14.5)
SODIUM SERPL-SCNC: 140 MMOL/L — SIGNIFICANT CHANGE UP (ref 135–145)
WBC # BLD: 7.87 K/UL — SIGNIFICANT CHANGE UP (ref 3.8–10.5)
WBC # FLD AUTO: 7.87 K/UL — SIGNIFICANT CHANGE UP (ref 3.8–10.5)

## 2021-10-31 PROCEDURE — 83735 ASSAY OF MAGNESIUM: CPT

## 2021-10-31 PROCEDURE — 71260 CT THORAX DX C+: CPT

## 2021-10-31 PROCEDURE — 82962 GLUCOSE BLOOD TEST: CPT

## 2021-10-31 PROCEDURE — 80053 COMPREHEN METABOLIC PANEL: CPT

## 2021-10-31 PROCEDURE — 85730 THROMBOPLASTIN TIME PARTIAL: CPT

## 2021-10-31 PROCEDURE — 83036 HEMOGLOBIN GLYCOSYLATED A1C: CPT

## 2021-10-31 PROCEDURE — 87476 LYME DIS DNA AMP PROBE: CPT

## 2021-10-31 PROCEDURE — 84484 ASSAY OF TROPONIN QUANT: CPT

## 2021-10-31 PROCEDURE — 0042T: CPT

## 2021-10-31 PROCEDURE — 71275 CT ANGIOGRAPHY CHEST: CPT

## 2021-10-31 PROCEDURE — 71046 X-RAY EXAM CHEST 2 VIEWS: CPT

## 2021-10-31 PROCEDURE — 80061 LIPID PANEL: CPT

## 2021-10-31 PROCEDURE — 74174 CTA ABD&PLVS W/CONTRAST: CPT

## 2021-10-31 PROCEDURE — 84443 ASSAY THYROID STIM HORMONE: CPT

## 2021-10-31 PROCEDURE — 70552 MRI BRAIN STEM W/DYE: CPT

## 2021-10-31 PROCEDURE — 99239 HOSP IP/OBS DSCHRG MGMT >30: CPT

## 2021-10-31 PROCEDURE — 70496 CT ANGIOGRAPHY HEAD: CPT

## 2021-10-31 PROCEDURE — 86618 LYME DISEASE ANTIBODY: CPT

## 2021-10-31 PROCEDURE — A9585: CPT

## 2021-10-31 PROCEDURE — 97161 PT EVAL LOW COMPLEX 20 MIN: CPT

## 2021-10-31 PROCEDURE — 36415 COLL VENOUS BLD VENIPUNCTURE: CPT

## 2021-10-31 PROCEDURE — 80048 BASIC METABOLIC PNL TOTAL CA: CPT

## 2021-10-31 PROCEDURE — 70450 CT HEAD/BRAIN W/O DYE: CPT

## 2021-10-31 PROCEDURE — 85610 PROTHROMBIN TIME: CPT

## 2021-10-31 PROCEDURE — 70498 CT ANGIOGRAPHY NECK: CPT

## 2021-10-31 PROCEDURE — 85027 COMPLETE CBC AUTOMATED: CPT

## 2021-10-31 PROCEDURE — 85025 COMPLETE CBC W/AUTO DIFF WBC: CPT

## 2021-10-31 PROCEDURE — 93306 TTE W/DOPPLER COMPLETE: CPT

## 2021-10-31 PROCEDURE — 84100 ASSAY OF PHOSPHORUS: CPT

## 2021-10-31 PROCEDURE — 99291 CRITICAL CARE FIRST HOUR: CPT | Mod: 25

## 2021-10-31 PROCEDURE — 87635 SARS-COV-2 COVID-19 AMP PRB: CPT

## 2021-10-31 RX ORDER — TOPIRAMATE 25 MG
25 TABLET ORAL ONCE
Refills: 0 | Status: COMPLETED | OUTPATIENT
Start: 2021-10-31 | End: 2021-10-31

## 2021-10-31 RX ORDER — ASPIRIN/CALCIUM CARB/MAGNESIUM 324 MG
1 TABLET ORAL
Qty: 30 | Refills: 0
Start: 2021-10-31 | End: 2021-11-29

## 2021-10-31 RX ORDER — NICOTINE POLACRILEX 2 MG
1 GUM BUCCAL
Qty: 30 | Refills: 0
Start: 2021-10-31 | End: 2021-11-29

## 2021-10-31 RX ORDER — NICOTINE POLACRILEX 2 MG
1 GUM BUCCAL
Qty: 60 | Refills: 0
Start: 2021-10-31 | End: 2021-11-29

## 2021-10-31 RX ORDER — TOPIRAMATE 25 MG
1 TABLET ORAL
Qty: 30 | Refills: 0
Start: 2021-10-31 | End: 2021-11-29

## 2021-10-31 RX ORDER — ATORVASTATIN CALCIUM 80 MG/1
1 TABLET, FILM COATED ORAL
Qty: 30 | Refills: 0
Start: 2021-10-31 | End: 2021-11-29

## 2021-10-31 RX ADMIN — Medication 81 MILLIGRAM(S): at 11:13

## 2021-10-31 RX ADMIN — Medication 2 MILLIGRAM(S): at 08:01

## 2021-10-31 NOTE — DISCHARGE NOTE NURSING/CASE MANAGEMENT/SOCIAL WORK - PATIENT PORTAL LINK FT
You can access the FollowMyHealth Patient Portal offered by [facility] by registering at the following website: [email or web address]. By joining [employer]’s FollowMyHealth portal, you will also be able to view your health information using other applications (apps) compatible with our system.

## 2021-10-31 NOTE — DISCHARGE NOTE PROVIDER - NSDCFUADDAPPT_GEN_ALL_CORE_FT
Follow up wit PCP in 1-2 weeks  Follow up with Dr. Isaac in 2 weeks (office will call you to schedule an appointment)  Follow up with Dr. Marco Antonio Lewis due to CT chest results (will call with official results of CT chest and to make a follow up appointment this week)

## 2021-10-31 NOTE — DISCHARGE NOTE PROVIDER - CARE PROVIDER_API CALL
Cecilia Isaac)  Neurology  [address] [place] [address]  Phone: [phone]  Fax: [phone]  Follow Up Time:     Marco Antonio Lewis)  Surgery  [address] [address]  Phone: [phone]  Fax: [phone]  Follow Up Time:

## 2021-10-31 NOTE — DISCHARGE NOTE PROVIDER - HOSPITAL COURSE
Hospital course:  41y Male with 39 pack year hx, no PMHx, last seen PCP 3 years ago, presents to [facility] for chest pain/SOB x 3 days and acute onset of left sided numbness starting at 0630 10/27, transferred to [facility] for stroke workup and r/o. NIHSS on arrival is 2 (left facial, left sensation deficit), ASPECT 10. Found to have lung mass suspicious for malignancy on Chest CT PE protocol. Admitted to stroke tele. During hospital course patient had two episodes of blurry vision in the left eye (temporal upper and lower quadrants), left sided numbness that started in face and shot down arm and leg, left arm drift, and headache (10/29 &10/30) which resolved spontaneously. The patient had a repeat CTH after episode on 10/29 which was stable.   During hospital stay, patient had MRI brain with and without contrast to rule out infarctions vs. leison in brain, however, MRI normal. vEEG with no epileptiform activity.   Patient now at baseline.   Impression: it is possible that patient is experiencing migraines which have caused these symptoms vs. seizure not captured on EEG vs. stress/anxiety. Etiology unclear, however, MRI brain normal- no signs of stroke & EEG with no epileptiform activity.   Plan to start topiramate 25mg daily for possible migraine vs. seizure.     Physical therapy and occupational therapy cleared patient for safe discharge home, no needs.     Smoking cessation explained at length to patient. Nicotine patch was administered during hospital course and was prescribed on discharge.  In addition, to cholesterol management. The patient was instructed to follow up with Dr. Marco Antonio Lewis for lung mass suspicious for malignancy. In addition, the patient was also instructed to follow up with Dr. Cecilia Isaac.     Patient had the following workup done in house:  CT Head No Cont (10.29.21 @ 13:19)   IMPRESSION: No intracranial hemorrhage or acute transcortical infarct.    CT Brain Stroke Protocol (10.27.21 @ 11:53)   Impression:  No acute intracranial hemorrhage, mass effect or large demarcated territorial infarction.    CT Perfusion w/ Maps w/ IV Cont (10.27.21 @ 12:23)   FINDINGS: The CT perfusion study demonstrates no perfusion abnormality. There is no mismatch volume.  IMPRESSION: Normal CT perfusion study.    CT Angio Neck w/ IV Cont (10.27.21 @ 12:27)   IMPRESSION: Normal CTA of the neck.    CT Angio Neck w/ IV Cont (10.27.21 @ 12:27)   IMPRESSION: No large vessel occlusion.    CT Angio Chest PE Protocol w/ IV Cont (10.27.21 @ 12:32)   IMPRESSION:  1.  Spiculated cavitary pulmonary nodule in right upper lobe 0.9 cm, suspicious for primary lung malignancy.  2.  Few upper limit of normal size mediastinal nodes, possibly malignant adenopathy. Advise FDG PET/CT correlation and/or tissue sampling.  3.  Mild centrilobular and paraseptal emphysema.  4.  No aortic dissection.    CT Angio Abdomen and Pelvis w/ IV Cont (10.27.21 @ 12:32) >  IMPRESSION:  1.  Spiculated cavitary pulmonary nodule in right upper lobe 0.9 cm, suspicious for primary lung malignancy.  2.  Few upper limit of normal size mediastinal nodes, possibly malignant adenopathy. Advise FDG PET/CT correlation and/or tissue sampling.  3.  Mild centrilobular and paraseptal emphysema.  4.  No aortic dissection.    Echocardiogram w/ Bubble and Doppler (10.28.21 @ 10:31) >  CONCLUSIONS:   1. Normal left and right ventricular size and systolic function.   2. Injection of agitated saline via a peripheral vein reveals no evidence of a right-to-left shunt.   3. No significant valvular disease.   4. No evidence of pulmonary hypertension.   5. No pericardial effusion.   6. No prior echo is available for comparison.    [x]labs  A1C with Estimated Average Glucose Result: 5.4  LDL Cholesterol Calculated: 152 mg/dL (10.28.21 @ 07:25)  Thyroid Stimulating Hormone, Serum: 1.880 uIU/mL (10.28.21 @ 07:25)    Physical exam at discharge:  Physical exam:  General: No acute distress, awake and alert  Eyes: Anicteric sclerae, moist conjunctivae, see below for CNs  Neck: trachea midline, FROM, supple, no thyromegaly or lymphadenopathy  Cardiovascular: Regular rate and rhythm, no murmurs, rubs, or gallops. No carotid bruits.   Pulmonary: Anterior breath sounds clear bilaterally, no crackles or wheezing. No use of accessory muscles  GI: Abdomen soft, non-distended, non-tender    Neurologic:  -Mental status: Awake, alert, oriented to person, place, and time. Speech is fluent with intact naming, repetition, and comprehension, no dysarthria. Recent and remote memory intact. Follows commands. Attention/concentration intact. Fund of knowledge appropriate.  -Cranial nerves:   II: Visual fields are full to finger counting.  III, IV, VI: Extraocular movements are intact without nystagmus. Pupils equally round and reactive to light  V:  slightly decreased sensation in left V2, however, improving   VII: Face is symmetric with normal eye closure and smile  VIII: Hearing is bilaterally intact to finger rub  IX, X: Uvula is midline and soft palate rises symmetrically  XI: Head turning and shoulder shrug are intact.  XII: Tongue protrudes midline  Motor: Normal bulk and tone. No pronator drift. Strength bilateral upper extremity 5/5, bilateral lower extremities 5/5.  Sensation: decreased sensation in the left arm, however, improving. Left leg sensation now intact - improved from admission.   Coordination: No dysmetria on finger-to-nose and heel-to-shin bilaterally    NIHSS on discharge: 2    New medications on discharge: topiramate 25mg daily, aspirin 81mg daily, atorvastatin 40mg daily, and nicotine patch  Imaging to be followed up: official report of CT Chest with IV contrast   Further outpatient workup: workup for Spiculated cavitary pulmonary nodule in right upper lobe 0.9 cm, suspicious for primary lung malignancy. Patient to follow up with CT surgeon Dr. Marco Antonio Lewis. Dr. Lewis will call with official results of CT Chest.   Follow up with PCP in 1-2 weeks   Follow up with Dr. Cecilia Isaac in 2 weeks - office will call to schedule appointment    Hospital course:  41y Male with 39 pack year hx but otherwise no known PMHx (last seen by PCP 3 years ago), who presented to [facility] for chest pain/SOB x 3 days and acute onset of left sided numbness/tingling (face/arm/leg) associated with  left eye blurriness and headache which began 10/27. He was transferred to [facility] for stroke workup and r/o. Initial CTH, CTA, and CTP were unremarkable.  NIHSS on arrival was 2 (left facial droop, left sensation deficit).  He was incidentally found to have lung mass suspicious for malignancy on Chest CT PE protocol. During hospital course patient had two episodes of blurry vision in the left eye (temporal upper and lower quadrants), left sided numbness that started in face and shot down arm and leg, left arm drift, and headache (10/29 &10/30) which resolved spontaneously. The patient had a repeat CTH after episode on 10/29 which was stable.  vEEG with no epileptiform activity during spell (overall totally normal EEG).   During hospital stay, patient had MRI brain with and without contrast to rule out infarctions vs. leison in brain, however, MRI normal.  Patient now at baseline.   Impression: it is possible that patient is experiencing headache/migraines which have caused these symptoms vs. seizure not captured on EEG vs. stress/anxiety vs other. Etiology unclear. He will require close outpatient follow-up for consideration of LP or repeat MRI with and without contrast pending clinical course.  Plan to start topiramate 25mg daily for possible migraine vs. seizure.     Physical therapy and occupational therapy cleared patient for safe discharge home, no needs.     Smoking cessation explained at length to patient. Nicotine patch was administered during hospital course and was prescribed on discharge.  In addition, to cholesterol management. The patient was instructed to follow up with Dr. Marco Antonio Lewis for lung mass suspicious for malignancy. In addition, the patient was also instructed to follow up with Dr. Cecilia Isaac.     Patient had the following workup done in house:  CT Head No Cont (10.29.21 @ 13:19)   IMPRESSION: No intracranial hemorrhage or acute transcortical infarct.    CT Brain Stroke Protocol (10.27.21 @ 11:53)   Impression:  No acute intracranial hemorrhage, mass effect or large demarcated territorial infarction.    CT Perfusion w/ Maps w/ IV Cont (10.27.21 @ 12:23)   FINDINGS: The CT perfusion study demonstrates no perfusion abnormality. There is no mismatch volume.  IMPRESSION: Normal CT perfusion study.    CT Angio Neck w/ IV Cont (10.27.21 @ 12:27)   IMPRESSION: Normal CTA of the neck.    CT Angio Neck w/ IV Cont (10.27.21 @ 12:27)   IMPRESSION: No large vessel occlusion.    CT Angio Chest PE Protocol w/ IV Cont (10.27.21 @ 12:32)   IMPRESSION:  1.  Spiculated cavitary pulmonary nodule in right upper lobe 0.9 cm, suspicious for primary lung malignancy.  2.  Few upper limit of normal size mediastinal nodes, possibly malignant adenopathy. Advise FDG PET/CT correlation and/or tissue sampling.  3.  Mild centrilobular and paraseptal emphysema.  4.  No aortic dissection.    CT Angio Abdomen and Pelvis w/ IV Cont (10.27.21 @ 12:32) >  IMPRESSION:  1.  Spiculated cavitary pulmonary nodule in right upper lobe 0.9 cm, suspicious for primary lung malignancy.  2.  Few upper limit of normal size mediastinal nodes, possibly malignant adenopathy. Advise FDG PET/CT correlation and/or tissue sampling.  3.  Mild centrilobular and paraseptal emphysema.  4.  No aortic dissection.    Echocardiogram w/ Bubble and Doppler (10.28.21 @ 10:31) >  CONCLUSIONS:   1. Normal left and right ventricular size and systolic function.   2. Injection of agitated saline via a peripheral vein reveals no evidence of a right-to-left shunt.   3. No significant valvular disease.   4. No evidence of pulmonary hypertension.   5. No pericardial effusion.   6. No prior echo is available for comparison.    [x]labs  A1C with Estimated Average Glucose Result: 5.4  LDL Cholesterol Calculated: 152 mg/dL (10.28.21 @ 07:25)  Thyroid Stimulating Hormone, Serum: 1.880 uIU/mL (10.28.21 @ 07:25)    Physical exam at discharge:  Physical exam:  General: No acute distress, awake and alert  Eyes: Anicteric sclerae, moist conjunctivae, see below for CNs  Neck: trachea midline, FROM, supple, no thyromegaly or lymphadenopathy  Cardiovascular: Regular rate and rhythm, no murmurs, rubs, or gallops. No carotid bruits.   Pulmonary: Anterior breath sounds clear bilaterally, no crackles or wheezing. No use of accessory muscles  GI: Abdomen soft, non-distended, non-tender    Neurologic:  -Mental status: Awake, alert, oriented to person, place, and time. Speech is fluent with intact naming, repetition, and comprehension, no dysarthria. Recent and remote memory intact. Follows commands. Attention/concentration intact. Fund of knowledge appropriate.  -Cranial nerves:   II: Visual fields are full to finger counting.  III, IV, VI: Extraocular movements are intact without nystagmus. Pupils equally round and reactive to light  V:  slightly decreased sensation in left V2, however, improving   VII: Face is symmetric with normal eye closure and smile  VIII: Hearing is bilaterally intact to finger rub  IX, X: Uvula is midline and soft palate rises symmetrically  XI: Head turning and shoulder shrug are intact.  XII: Tongue protrudes midline  Motor: Normal bulk and tone. No pronator drift. Strength bilateral upper extremity 5/5, bilateral lower extremities 5/5.  Sensation: decreased sensation in the left arm, however, improving. Left leg sensation now intact - improved from admission.   Coordination: No dysmetria on finger-to-nose and heel-to-shin bilaterally    NIHSS on discharge: 2    New medications on discharge: topiramate 25mg daily, aspirin 81mg daily, atorvastatin 40mg daily, and nicotine patch  Imaging to be followed up: official report of CT Chest with IV contrast   Further outpatient workup: workup for Spiculated cavitary pulmonary nodule in right upper lobe 0.9 cm, suspicious for primary lung malignancy. Patient to follow up with CT surgeon Dr. Marco Antonio Lewis.   Follow up with PCP in 1-2 weeks   Follow up with Dr. Cecilia Isaac in 2 weeks - office will call to schedule appointment

## 2021-10-31 NOTE — PROGRESS NOTE ADULT - ASSESSMENT
41y Male with 39 pack year hx, no PMHx, last seen PCP 3 years ago, presents to [facility] for chest pain/SOB x 3 days and acute onset of left sided numbness starting at 0630 10/27, transferred to [facility] for stroke workup and r/o. NIHSS on arrival is 2 (left facial, left sensation deficit), ASPECT 10. Found to have lung mass suspicious for malignancy on Chest CT PE protocol. Admitted to stroke tele. No acute overnight event. patient had an episode of blurry vision, left sided numbness during OT/PT on 10/29. Repeat EKG nml, TRop nmp, CTH: stable.   Patient with intermittent episodes of left shoulder pain, left arm numbness and left arm drift (episode on 10/29 & 10/30) which resolved spontaneously.    Today with improving left face and arm decreased sensation.    Neuro  #R/O Stroke vs Mets:  - continue aspirin 81mg daily  - continue atorvastatin 80mg daily  - q4hr stroke neuro checks and vitals  - obtain MRI Brain w contrast to r/o mets along with stroke.  - vEEG with no epileptiform events   - MRI w/ PRASHANTH - normal   - plan for LP today 10/31  - Stroke Code HCT Results: neg  - Stroke Code CTA Results: neg  - Stroke education    Cards  - BP goal<140 mmHg.  - TTE with bubble : Normal EF, No PFO.  - Stroke Code EKG Results: NSR    #HLD  - high dose statin as above in CVA  - LDL results: 152    Pulm  #Lung mass (incidental finding):   - call provider if SPO2 < 94%  - spiculated cavitary pulmonary nodule in right upper lobe 0.9cm, possible malignant adenopathy.  - Spoke to patient about the mass. Pending further consult to set up Outpatient follow up.  - f/u  CT chest with IV contrast result    GI  #Nutrition/Fluids/Electrolytes   - replete K<4 and Mg <2  - Diet: regular    Renal  - monitor I&Os    Infectious Disease  - monitor and trend WBCs    Endocrine  #DM  - A1C results: 5.4  - TSH results: 1.8    DVT Prophylaxis  - holding Lovenox sq for DVT prophylaxis for LP today    Dispo: Home, cleared by PT/OT  Discussed with Neurology Attending Dr. Isaac    IDR: Discussed plan, GOC with CM,SW, PT, OT.       41y Male with 39 pack year hx, no PMHx, last seen PCP 3 years ago, presents to [facility] for chest pain/SOB x 3 days and acute onset of left sided numbness starting at 0630 10/27, transferred to [facility] for stroke workup and r/o. NIHSS on arrival is 2 (left facial, left sensation deficit), ASPECT 10. Found to have lung mass suspicious for malignancy on Chest CT PE protocol. Admitted to stroke tele. No acute overnight event. patient had an episode of blurry vision, left sided numbness during OT/PT on 10/29. Repeat EKG nml, TRop nmp, CTH: stable.   Patient with intermittent episodes of left shoulder pain, left arm numbness and left arm drift (episode on 10/29 & 10/30) which resolved spontaneously.    Today with improving left face and arm decreased sensation.  Impression: it is possible that patient is experiencing migraines which have caused these symptoms vs. seizure not captures on EEG vs. stress/anxiety. Etiology unclear, however, MRI brain normal- no signs of stroke & EEG with no epileptiform activity.       Neuro  #R/O Stroke vs Mets:  - continue aspirin 81mg daily  - continue atorvastatin 80mg daily  - q4hr stroke neuro checks and vitals  - obtain MRI Brain w contrast to r/o mets along with stroke.  - vEEG with no epileptiform events   - MRI w/ PRASHANTH - normal   - plan for LP today 10/31  - Stroke Code HCT Results: neg  - Stroke Code CTA Results: neg  - Stroke education    Cards  - BP goal<140 mmHg.  - TTE with bubble : Normal EF, No PFO.  - Stroke Code EKG Results: NSR    #HLD  - high dose statin as above in CVA  - LDL results: 152    Pulm  #Lung mass (incidental finding):   - call provider if SPO2 < 94%  - spiculated cavitary pulmonary nodule in right upper lobe 0.9cm, possible malignant adenopathy.  - Spoke to patient about the mass. Pending further consult to set up Outpatient follow up.  - f/u  CT chest with IV contrast result    GI  #Nutrition/Fluids/Electrolytes   - replete K<4 and Mg <2  - Diet: regular    Renal  - monitor I&Os    Infectious Disease  - monitor and trend WBCs    Endocrine  #DM  - A1C results: 5.4  - TSH results: 1.8    DVT Prophylaxis  - holding Lovenox sq for DVT prophylaxis for LP today    Dispo: Home, cleared by PT/OT  Discussed with Neurology Attending Dr. Isaac    IDR: Discussed plan, GOC with CM,SW, PT, OT.       41y Male with 39 pack year hx, no PMHx, last seen PCP 3 years ago, presents to [facility] for chest pain/SOB x 3 days and acute onset of left sided numbness starting at 0630 10/27, transferred to [facility] for stroke workup and r/o. NIHSS on arrival is 2 (left facial, left sensation deficit), ASPECT 10. Found to have lung mass suspicious for malignancy on Chest CT PE protocol. Admitted to stroke tele. No acute overnight event. patient had an episode of blurry vision, left sided numbness during OT/PT on 10/29. Repeat EKG nml, TRop nmp, CTH: stable.   Patient with intermittent episodes of left shoulder pain, left arm numbness and left arm drift (episode on 10/29 & 10/30) which resolved spontaneously.    Today with improving left face and arm decreased sensation.  Impression: it is possible that patient is experiencing migraines which have caused these symptoms vs. seizure not captures on EEG vs. stress/anxiety. Etiology unclear, however, MRI brain normal- no signs of stroke & EEG with no epileptiform activity.       Neuro  #R/O Stroke vs Mets:  - continue aspirin 81mg daily  - continue atorvastatin 80mg daily  - q4hr stroke neuro checks and vitals  - obtain MRI Brain w contrast to r/o mets along with stroke.  - vEEG with no epileptiform events   - MRI w/ PRASHANTH - normal   - discussion for LP with general neuro Dr. Beal today, however, no clear indication for LP at this time, therefore, decision to defer LP was made  - Stroke Code HCT Results: neg  - Stroke Code CTA Results: neg  - Stroke education    Cards  - BP goal<140 mmHg.  - TTE with bubble : Normal EF, No PFO.  - Stroke Code EKG Results: NSR    #HLD  - high dose statin as above in CVA  - LDL results: 152    Pulm  #Lung mass (incidental finding):   - call provider if SPO2 < 94%  - spiculated cavitary pulmonary nodule in right upper lobe 0.9cm, possible malignant adenopathy.  - Spoke to patient about the mass. Pending further consult to set up Outpatient follow up.  - f/u  CT chest with IV contrast result    GI  #Nutrition/Fluids/Electrolytes   - replete K<4 and Mg <2  - Diet: regular    Renal  - monitor I&Os    Infectious Disease  - monitor and trend WBCs    Endocrine  #DM  - A1C results: 5.4  - TSH results: 1.8    DVT Prophylaxis  - was holding Lovenox sq for DVT prophylaxis for LP today, however, decision to defer LP was made    Dispo: Home, cleared by PT/OT  Discussed with Neurology Attending Dr. Poncho HOUSTONR: Discussed plan, GOC with CM,SW, PT, OT.

## 2021-10-31 NOTE — PROGRESS NOTE ADULT - REASON FOR ADMISSION
left sided numbness, chest pain

## 2021-10-31 NOTE — DISCHARGE NOTE PROVIDER - NSDCCPCAREPLAN_GEN_ALL_CORE_FT
PRINCIPAL DISCHARGE DIAGNOSIS  Diagnosis: Pulmonary nodule  Assessment and Plan of Treatment: Spiculated cavitary pulmonary nodule in right upper lobe 0.9 cm, suspicious for primary lung malignancy on CT chest PE protocol.   Follow up with Dr. Marco Antonio Lewis will call with official results of CT chest with IV contrast and for follow up for biopsy      SECONDARY DISCHARGE DIAGNOSES  Diagnosis: Left sided numbness  Assessment and Plan of Treatment: migraine vs. seizures not captured on EEG vs. stress/anxiety  Treatment: topiramate 25 mg daily

## 2021-10-31 NOTE — PROGRESS NOTE ADULT - SUBJECTIVE AND OBJECTIVE BOX
Neurology Stroke Progress Note    INTERVAL HPI/OVERNIGHT EVENTS:  No events overnight. EEG with no epileptiform activity.   Patient seen and examined this morning while patient sitting in chair. Patient denies any episodes of left arm weakness/left shoulder pain & numbness since yesterday morning. States that he has been walking around his room and denies feeling unsteady.       MEDICATIONS  (STANDING):  aspirin enteric coated 81 milliGRAM(s) Oral daily  atorvastatin 80 milliGRAM(s) Oral at bedtime  nicotine  Polacrilex Lozenge 2 milliGRAM(s) Oral two times a day    MEDICATIONS  (PRN):  acetaminophen     Tablet .. 650 milliGRAM(s) Oral every 6 hours PRN Moderate Pain (4 - 6)      Allergies    No Known Allergies    Intolerances        Vital Signs Last 24 Hrs  T(C): 36.6 (31 Oct 2021 05:05), Max: 36.6 (30 Oct 2021 21:18)  T(F): 97.9 (31 Oct 2021 05:05), Max: 97.9 (31 Oct 2021 05:05)  HR: 60 (31 Oct 2021 06:10) (60 - 84)  BP: 96/54 (31 Oct 2021 06:10) (95/50 - 128/86)  BP(mean): 71 (31 Oct 2021 06:10) (67 - 102)  RR: 16 (31 Oct 2021 06:10) (16 - 18)  SpO2: 100% (31 Oct 2021 06:10) (98% - 100%)    Physical exam:  General: No acute distress, awake and alert  Eyes: Anicteric sclerae, moist conjunctivae, see below for CNs  Neck: trachea midline, FROM, supple, no thyromegaly or lymphadenopathy  Cardiovascular: Regular rate and rhythm, no murmurs, rubs, or gallops. No carotid bruits.   Pulmonary: Anterior breath sounds clear bilaterally, no crackles or wheezing. No use of accessory muscles  GI: Abdomen soft, non-distended, non-tender    Neurologic:  -Mental status: Awake, alert, oriented to person, place, and time. Speech is fluent with intact naming, repetition, and comprehension, no dysarthria. Recent and remote memory intact. Follows commands. Attention/concentration intact. Fund of knowledge appropriate.  -Cranial nerves:   II: Visual fields are full to finger counting.  III, IV, VI: Extraocular movements are intact without nystagmus. Pupils equally round and reactive to light  V:  slightly decreased sensation in left V2, however, improving   VII: Face is symmetric with normal eye closure and smile  VIII: Hearing is bilaterally intact to finger rub  IX, X: Uvula is midline and soft palate rises symmetrically  XI: Head turning and shoulder shrug are intact.  XII: Tongue protrudes midline  Motor: Normal bulk and tone. No pronator drift. Strength bilateral upper extremity 5/5, bilateral lower extremities 5/5.  Sensation: decreased sensation in the left arm, however, improving. Left leg sensation now intact - improved from admission.   Coordination: No dysmetria on finger-to-nose and heel-to-shin bilaterally      LABS:                        15.1   7.87  )-----------( 268      ( 31 Oct 2021 07:37 )             47.2     10-31    140  |  105  |  18  ----------------------------<  x   4.7   |  25  |  0.92    Ca    9.6      31 Oct 2021 07:37  Phos  3.7     10-31  Mg     2.2     10-31      PT/INR - ( 31 Oct 2021 07:37 )   PT: 11.6 sec;   INR: 0.97          PTT - ( 31 Oct 2021 07:37 )  PTT:32.5 sec      RADIOLOGY & ADDITIONAL TESTS:    MR Head w/ IV Cont (10.30.21 @ 16:03) >  INTERPRETATION:  Sagittal, axial and coronal imaging of the brain was performed utilizing T1, T2, FLAIR and diffusion-weighted sequences.  Images were acquired both before and following intravenous contrast administration.    Contrast dose:7.5 cc of intravenous Gadavist.    Clinical information: Left-sided numbness.    The ventricles, sulci and cisterns are normal in caliber for the patient's age. There is no intracranial mass or pathologic contrast enhancement. There is no intracranial hemorrhage or diffusion restriction. There is no sellar mass. The craniocervical junction is normal.    Paranasal sinuses:Mucosal thickening is noted in the right maxillary and sphenoid sinuses and within ethmoidal air cells bilaterally.    Mastoids: Clear.    IMPRESSION:    Normal brain.    Day 1: 10/29/2021 @ 5:02:52 PM to next morning @ 07:00 am  Background:  continuous, with predominantly alpha and beta frequencies.  Symmetry:  No persistent asymmetries of voltage or frequency.  Posterior Dominant Rhythm:  10 Hz symmetric, well-organized, and well-modulated.  Organization: Appropriate anterior-posterior gradient.  Voltage:  Normal (20+ uV)  Variability: Yes. 		Reactivity: Yes.  N2 sleep: Symmetric, synchronous spindles and K complexes.  Spontaneous Activity:  No epileptiform discharges.  Periodic/rhythmic activity:  None.  Events:  No electrographic seizures. Episodes of left sided numbness reported with no associated epileptiform abnormalities.  Provocations:  Hyperventilation and Photic stimulation: was not performed.    Daily Summary:    1)	Episodes of left sided numbness reported with no associated epileptiform abnormalities.  No epileptiform activity and no significant clinical events occurred.     Neurology Stroke Progress Note    INTERVAL HPI/OVERNIGHT EVENTS:  No events overnight. EEG with no epileptiform activity.   Patient seen and examined this morning while patient sitting in chair. Patient denies any episodes of left arm weakness/left shoulder pain & numbness since yesterday morning. States that he has been walking around his room and denies feeling unsteady.       MEDICATIONS  (STANDING):  aspirin enteric coated 81 milliGRAM(s) Oral daily  atorvastatin 80 milliGRAM(s) Oral at bedtime  nicotine  Polacrilex Lozenge 2 milliGRAM(s) Oral two times a day    MEDICATIONS  (PRN):  acetaminophen     Tablet .. 650 milliGRAM(s) Oral every 6 hours PRN Moderate Pain (4 - 6)      Allergies    No Known Allergies    Intolerances        Vital Signs Last 24 Hrs  T(C): 36.6 (31 Oct 2021 05:05), Max: 36.6 (30 Oct 2021 21:18)  T(F): 97.9 (31 Oct 2021 05:05), Max: 97.9 (31 Oct 2021 05:05)  HR: 60 (31 Oct 2021 06:10) (60 - 84)  BP: 96/54 (31 Oct 2021 06:10) (95/50 - 128/86)  BP(mean): 71 (31 Oct 2021 06:10) (67 - 102)  RR: 16 (31 Oct 2021 06:10) (16 - 18)  SpO2: 100% (31 Oct 2021 06:10) (98% - 100%)    Physical exam:  General: No acute distress, awake and alert  Eyes: Anicteric sclerae, moist conjunctivae, see below for CNs  Neck: trachea midline, FROM, supple, no thyromegaly or lymphadenopathy  Cardiovascular: Regular rate and rhythm, no murmurs, rubs, or gallops. No carotid bruits.   Pulmonary: Anterior breath sounds clear bilaterally, no crackles or wheezing. No use of accessory muscles  GI: Abdomen soft, non-distended, non-tender    Neurologic:  -Mental status: Awake, alert, oriented to person, place, and time. Speech is fluent with intact naming, repetition, and comprehension, no dysarthria. Recent and remote memory intact. Follows commands. Attention/concentration intact. Fund of knowledge appropriate.  -Cranial nerves:   II: Visual fields are full to finger counting.  III, IV, VI: Extraocular movements are intact without nystagmus. Pupils equally round and reactive to light  V:  slightly decreased sensation in left V2, however, improving   VII: Face is symmetric with normal eye closure and smile  VIII: Hearing is bilaterally intact to finger rub  IX, X: Uvula is midline and soft palate rises symmetrically  XI: Head turning and shoulder shrug are intact.  XII: Tongue protrudes midline  Motor: Normal bulk and tone. No pronator drift. Strength bilateral upper extremity 5/5, bilateral lower extremities 5/5.  Sensation: decreased sensation in the left arm, however, improving. Left leg sensation now intact - improved from admission.   Coordination: No dysmetria on finger-to-nose and heel-to-shin bilaterally      LABS:                        15.1   7.87  )-----------( 268      ( 31 Oct 2021 07:37 )             47.2     10-31    140  |  105  |  18  ----------------------------<  x   4.7   |  25  |  0.92    Ca    9.6      31 Oct 2021 07:37  Phos  3.7     10-31  Mg     2.2     10-31      PT/INR - ( 31 Oct 2021 07:37 )   PT: 11.6 sec;   INR: 0.97          PTT - ( 31 Oct 2021 07:37 )  PTT:32.5 sec      RADIOLOGY & ADDITIONAL TESTS:    MR Head w/ IV Cont (10.30.21 @ 16:03)   INTERPRETATION:  Sagittal, axial and coronal imaging of the brain was performed utilizing T1, T2, FLAIR and diffusion-weighted sequences.  Images were acquired both before and following intravenous contrast administration.    Contrast dose:7.5 cc of intravenous Gadavist.    Clinical information: Left-sided numbness.    The ventricles, sulci and cisterns are normal in caliber for the patient's age. There is no intracranial mass or pathologic contrast enhancement. There is no intracranial hemorrhage or diffusion restriction. There is no sellar mass. The craniocervical junction is normal.    Paranasal sinuses:Mucosal thickening is noted in the right maxillary and sphenoid sinuses and within ethmoidal air cells bilaterally.    Mastoids: Clear.    IMPRESSION:    Normal brain.    Day 1: 10/29/2021 @ 5:02:52 PM to next morning @ 07:00 am  Background:  continuous, with predominantly alpha and beta frequencies.  Symmetry:  No persistent asymmetries of voltage or frequency.  Posterior Dominant Rhythm:  10 Hz symmetric, well-organized, and well-modulated.  Organization: Appropriate anterior-posterior gradient.  Voltage:  Normal (20+ uV)  Variability: Yes. 		Reactivity: Yes.  N2 sleep: Symmetric, synchronous spindles and K complexes.  Spontaneous Activity:  No epileptiform discharges.  Periodic/rhythmic activity:  None.  Events:  No electrographic seizures. Episodes of left sided numbness reported with no associated epileptiform abnormalities.  Provocations:  Hyperventilation and Photic stimulation: was not performed.    Daily Summary:    1)	Episodes of left sided numbness reported with no associated epileptiform abnormalities.  No epileptiform activity and no significant clinical events occurred.

## 2021-10-31 NOTE — DISCHARGE NOTE PROVIDER - NSDCMRMEDTOKEN_GEN_ALL_CORE_FT
aspirin 81 mg oral delayed release tablet: 1 tab(s) orally once a day  atorvastatin 40 mg oral tablet: 1 tab(s) orally once a day  topiramate 25 mg oral tablet: 1 tab(s) orally once   aspirin 81 mg oral delayed release tablet: 1 tab(s) orally once a day  atorvastatin 40 mg oral tablet: 1 tab(s) orally once a day  Nicotine System Kit transdermal film, extended release: 1 each transdermal once a day  topiramate 25 mg oral tablet: 1 tab(s) orally once   aspirin 81 mg oral delayed release tablet: 1 tab(s) orally once a day  atorvastatin 40 mg oral tablet: 1 tab(s) orally once a day  nicotine 2 mg oral transmucosal lozenge: 1 lozenge oral transmucosal 2 times a day   topiramate 25 mg oral tablet: 1 tab(s) orally once

## 2021-11-01 PROBLEM — Z78.9 OTHER SPECIFIED HEALTH STATUS: Chronic | Status: ACTIVE | Noted: 2021-10-27

## 2021-11-01 LAB
B BURGDOR C6 AB SER-ACNC: NEGATIVE — SIGNIFICANT CHANGE UP
B BURGDOR IGG+IGM SER-ACNC: 0.55 INDEX — SIGNIFICANT CHANGE UP (ref 0.01–0.89)

## 2021-11-02 PROBLEM — Z00.00 ENCOUNTER FOR PREVENTIVE HEALTH EXAMINATION: Status: ACTIVE | Noted: 2021-11-02

## 2021-11-03 LAB — B BURGDOR DNA SPEC QL NAA+PROBE: NEGATIVE — SIGNIFICANT CHANGE UP

## 2021-11-04 ENCOUNTER — APPOINTMENT (OUTPATIENT)
Dept: THORACIC SURGERY | Facility: CLINIC | Age: 41
End: 2021-11-04
Payer: COMMERCIAL

## 2021-11-04 VITALS
SYSTOLIC BLOOD PRESSURE: 132 MMHG | OXYGEN SATURATION: 96 % | HEART RATE: 91 BPM | TEMPERATURE: 98.1 F | BODY MASS INDEX: 28.17 KG/M2 | WEIGHT: 208 LBS | RESPIRATION RATE: 17 BRPM | HEIGHT: 72 IN | DIASTOLIC BLOOD PRESSURE: 86 MMHG

## 2021-11-04 DIAGNOSIS — R51.9 HEADACHE, UNSPECIFIED: ICD-10-CM

## 2021-11-04 DIAGNOSIS — E78.5 HYPERLIPIDEMIA, UNSPECIFIED: ICD-10-CM

## 2021-11-04 DIAGNOSIS — I10 ESSENTIAL (PRIMARY) HYPERTENSION: ICD-10-CM

## 2021-11-04 DIAGNOSIS — R59.0 LOCALIZED ENLARGED LYMPH NODES: ICD-10-CM

## 2021-11-04 DIAGNOSIS — R59.9 ENLARGED LYMPH NODES, UNSPECIFIED: ICD-10-CM

## 2021-11-04 DIAGNOSIS — G43.909 MIGRAINE, UNSPECIFIED, NOT INTRACTABLE, WITHOUT STATUS MIGRAINOSUS: ICD-10-CM

## 2021-11-04 DIAGNOSIS — R91.1 SOLITARY PULMONARY NODULE: ICD-10-CM

## 2021-11-04 DIAGNOSIS — G40.909 EPILEPSY, UNSPECIFIED, NOT INTRACTABLE, WITHOUT STATUS EPILEPTICUS: ICD-10-CM

## 2021-11-04 DIAGNOSIS — R20.2 PARESTHESIA OF SKIN: ICD-10-CM

## 2021-11-04 DIAGNOSIS — R56.9 UNSPECIFIED CONVULSIONS: ICD-10-CM

## 2021-11-04 DIAGNOSIS — J43.8 OTHER EMPHYSEMA: ICD-10-CM

## 2021-11-04 DIAGNOSIS — R07.89 OTHER CHEST PAIN: ICD-10-CM

## 2021-11-04 DIAGNOSIS — F17.200 NICOTINE DEPENDENCE, UNSPECIFIED, UNCOMPLICATED: ICD-10-CM

## 2021-11-04 DIAGNOSIS — R29.810 FACIAL WEAKNESS: ICD-10-CM

## 2021-11-04 PROCEDURE — 99203 OFFICE O/P NEW LOW 30 MIN: CPT

## 2021-11-04 NOTE — ASSESSMENT
[FreeTextEntry1] : Patient is a 41 year old male with a 30 pack year smoking history who is seen today in clinic for an incidentally found cavitary pulmonary nodule with associated lymphadenopathy. This is his initial evaluation. \par \par Patient presented to emergency department with shortness of breath and neurologic symptoms. Admitting to Stroke Service and work up was negative for any acute neurologic process. [place] MRI obtained, which was normal. On CT scan of the neck, RUL lung nodule identified. CT scan of the chest with IV contrast confirms a RUL lung nodule with associated hilar and mediastinal lymphadenopathy. Patient denies hemoptysis. Neuro symptoms have resolved. No musculoskeletal pain. \par \par Patient has smoke 1 to 1.5 packs of cigarettes per day for the past 20 years. Actively quitting. 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 No first degree family history of lung cancer. No personal history of cancer. No recent upper respiratory tract infections. \par \par History is otherwise unremarkable. \par \par Patient is a normal appearing male, well nourished, sitting comfortably in no distress.\par \par CT scan reviewed. Cavitary nodule in the RUL. No other lung lesions. Right hilar adenopathy and paratracheal adenopathy. \par \par Patient presents with an incidentally found pulmonary nodule. His biggest risk factor for primary lung cancer is his extensive smoking history and possible exposure to asbestos. In review of the imaging with radiology, this may be an infectious or inflammatory process based on nodule characteristics, but based on the [facility] risk calculator for pulmonary nodules, the probability of this lesion being malignant is 15.7%. Therefore, I will prescribe him a 1 week course of levofloxacin and rescan his chest with IV contrast in 6 weeks. I will evaluate the lesion at that time and determine what further work up needs to be performed, if any. \par \par I discussed this with the patient and his wife who are in agreement. They had no further questions or concerns. \par \par PLAN\par 1.  1 week course of Levofloxacin\par 2.  CT scan of the chest with IV contrast in 6 weeks with return visit\par \par

## 2021-11-04 NOTE — CONSULT LETTER
[Dear  ___] : Dear  [unfilled], [Consult Letter:] : I had the pleasure of evaluating your patient, [unfilled]. [Please see my note below.] : Please see my note below. [Consult Closing:] : Thank you very much for allowing me to participate in the care of this patient.  If you have any questions, please do not hesitate to contact me. [Sincerely,] : Sincerely, [FreeTextEntry3] : Dr. Marco Antonio Lewis

## 2021-11-18 ENCOUNTER — NON-APPOINTMENT (OUTPATIENT)
Age: 41
End: 2021-11-18

## 2021-12-16 ENCOUNTER — APPOINTMENT (OUTPATIENT)
Dept: THORACIC SURGERY | Facility: CLINIC | Age: 41
End: 2021-12-16
Payer: COMMERCIAL

## 2021-12-16 ENCOUNTER — APPOINTMENT (OUTPATIENT)
Dept: CT IMAGING | Facility: HOSPITAL | Age: 41
End: 2021-12-16

## 2021-12-16 ENCOUNTER — OUTPATIENT (OUTPATIENT)
Dept: OUTPATIENT SERVICES | Facility: HOSPITAL | Age: 41
LOS: 1 days | End: 2021-12-16
Payer: COMMERCIAL

## 2021-12-16 VITALS
DIASTOLIC BLOOD PRESSURE: 74 MMHG | WEIGHT: 203 LBS | BODY MASS INDEX: 27.5 KG/M2 | OXYGEN SATURATION: 95 % | RESPIRATION RATE: 18 BRPM | TEMPERATURE: 97.4 F | HEART RATE: 87 BPM | SYSTOLIC BLOOD PRESSURE: 125 MMHG | HEIGHT: 72 IN

## 2021-12-16 PROCEDURE — 71275 CT ANGIOGRAPHY CHEST: CPT

## 2021-12-16 PROCEDURE — 99213 OFFICE O/P EST LOW 20 MIN: CPT

## 2021-12-16 PROCEDURE — 71275 CT ANGIOGRAPHY CHEST: CPT | Mod: 26

## 2021-12-16 NOTE — HISTORY OF PRESENT ILLNESS
[FreeTextEntry1] : 40 y/o male, anneliese foster, 30 pack years, with a PMH of recent chest pain and left sided head discomfort with numbness down his arm. He works as a  in the construction field. He denies family history of cancer or recent URI/COVID. At his last visit he was prescribe 1 week of Levaquin. He presents today to review CT chest 6 weeks post treatment for evaluation of RUL lung nodule.\par \par CT head completed on 10/29/21:\par -no intracranial hemorrhage or acute transcortical infarct \par \par CTA chest completed on 10/27/21:\par -spiculated cavitary pulmonary nodule in right upper lobe 0.9cm, suspicious for primary lung malignancy\par -few upper limit of normal size mediastinal nodes, malignant adenopathy. Advised FDG PET/CT correlation and/ or tissue sampling\par -mild centrilobular and paraseptal emphysema\par -no aortic dissection \par \par CT chest done on 12/16/21:\par - pending \par

## 2021-12-16 NOTE — PHYSICAL EXAM
[] : no respiratory distress [Respiration, Rhythm And Depth] : normal respiratory rhythm and effort [Exaggerated Use Of Accessory Muscles For Inspiration] : no accessory muscle use [Examination Of The Chest] : the chest was normal in appearance

## 2021-12-16 NOTE — ASSESSMENT
[FreeTextEntry1] : Patient is a 41 year old male, current smoker, trying to quit, who presents today for follow up of a lung nodule. Patient was seen about 6 weeks ago and was treated with 1 week of Levofloxacin. Repeat scan today shows resolution of the area of concern with a small area of scarring. \par \par Overall patient is well, but is unable to completely quit smoking. \par \par CT reviewed. Official read is pending.\par \par Patient strongly encouraged to quit smoking completely and support systems were offered. This lesion was likely inflammatory or infectious but given his significant smoking history, we will continue to follow it for the time being. This was explained to the patient and his partner and they were in agreement. \par \par PLAN:\par 1. Smoking cessation strongly encouraged\par 2. CT chest in 6 months\par 3. Refer to pulmonary - Dr. Bejarano

## 2021-12-20 ENCOUNTER — NON-APPOINTMENT (OUTPATIENT)
Age: 41
End: 2021-12-20

## 2022-01-21 NOTE — HISTORY OF PRESENT ILLNESS
[FreeTextEntry1] : 42 y/o male, 30 pack years, with a PMH of recent chest pain and left sided head discomfort with numbness down his arm. He works as a  in the construction field. He denies family history of cancer or recent URI/COVID. He presents today for further evaluation of the RUL lung nodule. \par \par CT head completed on 10/29/21:\par -no intracranial hemorrhage or acute transcortical infarct \par \par CTA chest completed on 10/27/21:\par -spiculated cavitary pulmonary nodule in right upper lobe 0.9cm, suspicious for primary lung malignancy\par -few upper limit of normal size mediastinal nodes, malignant adenopathy. Advised FDG PET/CT correlation and/ or tissue sampling\par -mild centrilobular and paraseptal emphysema\par -no aortic dissection 
good balance

## 2022-06-21 ENCOUNTER — APPOINTMENT (OUTPATIENT)
Dept: CT IMAGING | Facility: HOSPITAL | Age: 42
End: 2022-06-21
Payer: COMMERCIAL

## 2022-06-21 ENCOUNTER — RESULT REVIEW (OUTPATIENT)
Age: 42
End: 2022-06-21

## 2022-06-21 ENCOUNTER — OUTPATIENT (OUTPATIENT)
Dept: OUTPATIENT SERVICES | Facility: HOSPITAL | Age: 42
LOS: 1 days | End: 2022-06-21
Payer: COMMERCIAL

## 2022-06-21 PROCEDURE — 71250 CT THORAX DX C-: CPT | Mod: 26

## 2022-06-21 PROCEDURE — 71250 CT THORAX DX C-: CPT

## 2022-06-30 ENCOUNTER — APPOINTMENT (OUTPATIENT)
Dept: THORACIC SURGERY | Facility: CLINIC | Age: 42
End: 2022-06-30

## 2022-06-30 DIAGNOSIS — R91.1 SOLITARY PULMONARY NODULE: ICD-10-CM

## 2022-06-30 PROCEDURE — 99441: CPT

## 2022-06-30 NOTE — ASSESSMENT
[FreeTextEntry1] : 40 y/o male, anneliese foster, 30 pack years, with a PMH of recent chest pain and left sided head discomfort with numbness down his arm. He works as a  in the construction field. He denies family history of cancer or recent URI/COVID. At his last visit he was prescribe 1 week of Levaquin. He presents today to review his CT chest. \par \par CT chest completed on 06/21/22:\par 1. Since 12/16/2021, there has been continued decrease in size of a now 3 mm solid nodule in the right upper lobe.\par 2. Emphysema.\par 3. Pericardial calcifications again present. This could be secondary to prior infection, uremic pericarditis, rheumatic heart disease, collagen vascular disease, or idiopathic. Clinical correlation recommended.\par \par CT chest was reviewed and the nodule in the right upper lobe has decreased in size. Will plan to continue to monitor with a repeat CT chest in six months. \par \par With regards to the pericardial calcifications, I discussed with Dr. Lewis and feel he should be seen by a Cardiologist for follow up. Will refer to Dr. William Nino for further evaluation. Patient verbally understood the plan of care. \par \par Plan:\par 1. CT chest in six months \par 2. Referral to Dr. William Nino \par

## 2022-06-30 NOTE — HISTORY OF PRESENT ILLNESS
[Home] : at home, [unfilled] , at the time of the visit. [Medical Office: ([facility])___] : at the medical office located in  [Verbal consent obtained from patient] : the patient, [unfilled] [FreeTextEntry1] : 42 y/o male, anneliese foster, 30 pack years, with a PMH of recent chest pain and left sided head discomfort with numbness down his arm. He works as a  in the construction field. He denies family history of cancer or recent URI/COVID. At his last visit he was prescribe 1 week of Levaquin. He presents today to review his CT chest. \par \par CT head completed on 10/29/21:\par -no intracranial hemorrhage or acute transcortical infarct \par \par CTA chest completed on 10/27/21:\par -spiculated cavitary pulmonary nodule in right upper lobe 0.9cm, suspicious for primary lung malignancy\par -few upper limit of normal size mediastinal nodes, malignant adenopathy. Advised FDG PET/CT correlation and/ or tissue sampling\par -mild centrilobular and paraseptal emphysema\par -no aortic dissection \par \par CT chest completed on 06/21/22:\par 1. Since 12/16/2021, there has been continued decrease in size of a now 3 mm solid nodule in the right upper lobe.\par 2. Emphysema.\par 3. Pericardial calcifications again present. This could be secondary to prior infection, uremic pericarditis, rheumatic heart disease, collagen vascular disease, or idiopathic. Clinical correlation recommended.\par

## 2022-07-19 ENCOUNTER — NON-APPOINTMENT (OUTPATIENT)
Age: 42
End: 2022-07-19

## 2022-07-19 ENCOUNTER — APPOINTMENT (OUTPATIENT)
Dept: HEART AND VASCULAR | Facility: CLINIC | Age: 42
End: 2022-07-19

## 2022-07-19 VITALS
BODY MASS INDEX: 28.44 KG/M2 | TEMPERATURE: 98 F | HEART RATE: 84 BPM | WEIGHT: 210 LBS | HEIGHT: 72 IN | OXYGEN SATURATION: 95 % | SYSTOLIC BLOOD PRESSURE: 144 MMHG | DIASTOLIC BLOOD PRESSURE: 88 MMHG

## 2022-07-19 VITALS — DIASTOLIC BLOOD PRESSURE: 83 MMHG | SYSTOLIC BLOOD PRESSURE: 128 MMHG

## 2022-07-19 DIAGNOSIS — R07.89 OTHER CHEST PAIN: ICD-10-CM

## 2022-07-19 DIAGNOSIS — Z78.9 OTHER SPECIFIED HEALTH STATUS: ICD-10-CM

## 2022-07-19 DIAGNOSIS — F17.200 NICOTINE DEPENDENCE, UNSPECIFIED, UNCOMPLICATED: ICD-10-CM

## 2022-07-19 PROCEDURE — 93000 ELECTROCARDIOGRAM COMPLETE: CPT

## 2022-07-19 PROCEDURE — 99204 OFFICE O/P NEW MOD 45 MIN: CPT | Mod: 25

## 2022-07-19 NOTE — REASON FOR VISIT
[Symptom and Test Evaluation] : symptom and test evaluation [FreeTextEntry1] : 42 yo smoker man  with who was admitted to [facility] last year for stroke-like symptoms ( left-sided weakness and facial drooping) with negative work-up. Incidental finding of a RUL noduel. He recently had a chest CT which revealed pericardial  calcifications. Presently he has c/o midsternal chest discomfort described as pressure-like at rest and with activity. Relieved with stretching. No other associated symptoms but feels fatigued for the last few weeks. He had an echo w/ bubble study (WNL) during his last [facility] admission but no other work-up since. He is able to walk far distances and climb stairs without any issues. \par \par PCP: Dr. Daniel Hoffmann.\par

## 2022-07-19 NOTE — DISCUSSION/SUMMARY
[FreeTextEntry1] : Cp, atypical pericardiac calcification GUSTAVO and I had a discussion of his symptoms. His risk for CAD falls intro intermediate. We will therefore move forward with a stress ekg and echocardiogram at this time to evaluate for obstructive CAD and risk stratification, provided an insurance approval can be obtained. My preference would be stress echocardiogram, but it is unlikely to be approved at this time. Risks and benefits discussed.\par EKG section of the chart --- secondary to symptoms above an electrocardiogram also known as an EKG was performed.  Risks and benefits discussed with the patient. Patient was given time and privacy to changed into a gown. Shortly after, standard 10 leads were applied and a Apisphere system was used to perform the study. The results were subsequently reviewed by attending physician and discussed with the patient. The study showed a normal sinus rhythm and no ST-T suggestive of ischemia. Order for the EKG was placed in the chart. The results were documented. Billing submitted. Emotional support provided.\par

## 2022-07-19 NOTE — REVIEW OF SYSTEMS
[Feeling Fatigued] : feeling fatigued [Chest Discomfort] : chest discomfort [Negative] : Cardiovascular

## 2022-08-31 ENCOUNTER — APPOINTMENT (OUTPATIENT)
Dept: HEART AND VASCULAR | Facility: CLINIC | Age: 42
End: 2022-08-31

## 2023-09-26 NOTE — H&P ADULT - NSCORESITESY/N_GEN_A_CORE_RD
Pt to ED via PV. Pt c/o right foot redness, swelling that started x 1 month with worsening symptoms x 1 week. Denies fever. Pt hx DM #2  
No

## 2024-04-03 NOTE — PATIENT PROFILE ADULT - NSPRESCRALCSIXMORE_GEN_A_NUR
In an effort to ensure that our patients LiveWell, a Team Member has reviewed your chart and identified an opportunity to provide the best care possible. An attempt was made to discuss or schedule overdue Preventive or Disease Management screening. The Outcome was Contact was not made, letter/portal message sent. If you have any questions or need help with scheduling, contact your primary care provider. . Care Gaps include Immunizations and Wellness Visits. Never

## 2025-01-17 NOTE — ED ADULT NURSE NOTE - RESPIRATORY ASSESSMENT
Orientation to room/Bed in low position, brakes on/Side rails x 2 or 4 up, assess large gaps, such that a patient could get extremity or other body part entrapped, use additional safety procedures/Call light is within reach, educate patient/family on its functionality/Environment clear of unused equipment, furniture's in place, clear of hazards/Educate patient/parents of falls protocol precautions/Developmentally place patient in appropriate bed/Remove all unused equipment out of the room - - -

## 2025-03-19 NOTE — ED ADULT TRIAGE NOTE - BP NONINVASIVE SYSTOLIC (MM HG)
Group Topic: BH Coping Skills Education    Date: 3/19/2025  Start Time: 1300  End Time: 1345  Facilitators: Magill, Debora, MSW    Focus: Gratitude  Number in attendance: 7/15    Expressing thanks may be one of the simplest ways to feel better.  With gratitude people acknowledge the goodness in their lives and doing so is consistently associated with increased happiness.  Gratitude helps people feel more positive emotions, relish good experiences, improve their health, deal with adversity and build strong relationships.  Group members watched a video on 'Graditude' and then shared an unhealthy coping skill they would like to give up and a healthy coping skill to replace it.    Method: Group  Attendance: Present  Participation: Active  Patient Response: Attentive, Good eye contact, Interactive, and Interested in topic  Mood: Anxious  Affect: Type: Anxious   Range: Full (normal)   Congruency: Congruent   Stability: Stable  Behavior/Socialization: Appropriate to group, Cooperative, and Engaged  Thought Process: Focused and Loose associations  Task Performance: Follows directions  Patient Evaluation: Independent - full participation    Pt shared that she is grateful for the [place] and the [employer],theory, for her parents and siblings and for her innocence as a child.  Debbie Magill, KATHY, LCSW       [phone]